# Patient Record
Sex: MALE | Race: WHITE | Employment: OTHER | URBAN - METROPOLITAN AREA
[De-identification: names, ages, dates, MRNs, and addresses within clinical notes are randomized per-mention and may not be internally consistent; named-entity substitution may affect disease eponyms.]

---

## 2024-06-02 ENCOUNTER — APPOINTMENT (OUTPATIENT)
Dept: GENERAL RADIOLOGY | Age: 69
DRG: 637 | End: 2024-06-02
Payer: OTHER GOVERNMENT

## 2024-06-02 ENCOUNTER — HOSPITAL ENCOUNTER (INPATIENT)
Age: 69
LOS: 3 days | Discharge: HOME | DRG: 637 | End: 2024-06-05
Attending: EMERGENCY MEDICINE | Admitting: INTERNAL MEDICINE
Payer: OTHER GOVERNMENT

## 2024-06-02 ENCOUNTER — APPOINTMENT (OUTPATIENT)
Dept: CT IMAGING | Age: 69
DRG: 637 | End: 2024-06-02
Payer: OTHER GOVERNMENT

## 2024-06-02 DIAGNOSIS — N17.9 ACUTE RENAL FAILURE, UNSPECIFIED ACUTE RENAL FAILURE TYPE (HCC): ICD-10-CM

## 2024-06-02 DIAGNOSIS — K20.90 ESOPHAGITIS: ICD-10-CM

## 2024-06-02 DIAGNOSIS — E11.10 DIABETIC KETOACIDOSIS WITHOUT COMA ASSOCIATED WITH TYPE 2 DIABETES MELLITUS (HCC): Primary | ICD-10-CM

## 2024-06-02 DIAGNOSIS — K92.2 GASTROINTESTINAL HEMORRHAGE, UNSPECIFIED GASTROINTESTINAL HEMORRHAGE TYPE: ICD-10-CM

## 2024-06-02 LAB
ALBUMIN SERPL-MCNC: 4.2 G/DL (ref 3.4–5)
ALP SERPL-CCNC: 121 U/L (ref 40–129)
ALT SERPL-CCNC: 13 U/L (ref 10–40)
ANION GAP SERPL CALCULATED.3IONS-SCNC: 38 MMOL/L (ref 3–16)
ANION GAP SERPL CALCULATED.3IONS-SCNC: 39 MMOL/L (ref 3–16)
AST SERPL-CCNC: 14 U/L (ref 15–37)
BASE EXCESS BLDV CALC-SCNC: -19.7 MMOL/L (ref -2–3)
BASOPHILS # BLD: 0 K/UL (ref 0–0.2)
BASOPHILS NFR BLD: 0.3 %
BILIRUB DIRECT SERPL-MCNC: <0.2 MG/DL (ref 0–0.3)
BILIRUB INDIRECT SERPL-MCNC: ABNORMAL MG/DL (ref 0–1)
BILIRUB SERPL-MCNC: 0.3 MG/DL (ref 0–1)
BUN SERPL-MCNC: 50 MG/DL (ref 7–20)
BUN SERPL-MCNC: 50 MG/DL (ref 7–20)
CALCIUM SERPL-MCNC: 9.4 MG/DL (ref 8.3–10.6)
CALCIUM SERPL-MCNC: 9.7 MG/DL (ref 8.3–10.6)
CHLORIDE SERPL-SCNC: 100 MMOL/L (ref 99–110)
CHLORIDE SERPL-SCNC: 97 MMOL/L (ref 99–110)
CO2 BLDV-SCNC: 8 MMOL/L
CO2 SERPL-SCNC: 7 MMOL/L (ref 21–32)
CO2 SERPL-SCNC: 7 MMOL/L (ref 21–32)
COHGB MFR BLDV: 1.2 % (ref 0–1.5)
CREAT SERPL-MCNC: 2.3 MG/DL (ref 0.8–1.3)
CREAT SERPL-MCNC: 2.4 MG/DL (ref 0.8–1.3)
DEPRECATED RDW RBC AUTO: 14 % (ref 12.4–15.4)
DO-HGB MFR BLDV: 27.3 %
EOSINOPHIL # BLD: 0 K/UL (ref 0–0.6)
EOSINOPHIL NFR BLD: 0 %
GFR SERPLBLD CREATININE-BSD FMLA CKD-EPI: 29 ML/MIN/{1.73_M2}
GFR SERPLBLD CREATININE-BSD FMLA CKD-EPI: 30 ML/MIN/{1.73_M2}
GLUCOSE BLD-MCNC: 282 MG/DL (ref 70–99)
GLUCOSE BLD-MCNC: 305 MG/DL (ref 70–99)
GLUCOSE BLD-MCNC: 322 MG/DL (ref 70–99)
GLUCOSE SERPL-MCNC: 306 MG/DL (ref 70–99)
GLUCOSE SERPL-MCNC: 311 MG/DL (ref 70–99)
HCO3 BLDV-SCNC: 7.2 MMOL/L (ref 24–28)
HCT VFR BLD AUTO: 50.2 % (ref 40.5–52.5)
HGB BLD-MCNC: 16.4 G/DL (ref 13.5–17.5)
LACTATE BLDV-SCNC: 2.5 MMOL/L (ref 0.4–2)
LIPASE SERPL-CCNC: 23 U/L (ref 13–60)
LYMPHOCYTES # BLD: 1.3 K/UL (ref 1–5.1)
LYMPHOCYTES NFR BLD: 7.7 %
MAGNESIUM SERPL-MCNC: 2.3 MG/DL (ref 1.8–2.4)
MCH RBC QN AUTO: 28.6 PG (ref 26–34)
MCHC RBC AUTO-ENTMCNC: 32.7 G/DL (ref 31–36)
MCV RBC AUTO: 87.6 FL (ref 80–100)
METHGB MFR BLDV: 0 % (ref 0–1.5)
MONOCYTES # BLD: 0.5 K/UL (ref 0–1.3)
MONOCYTES NFR BLD: 2.9 %
NEUTROPHILS # BLD: 15.6 K/UL (ref 1.7–7.7)
NEUTROPHILS NFR BLD: 89.1 %
NT-PROBNP SERPL-MCNC: 112 PG/ML (ref 0–124)
PCO2 BLDV: 20.7 MMHG (ref 41–51)
PERFORMED ON: ABNORMAL
PH BLDV: 7.15 [PH] (ref 7.35–7.45)
PHOSPHATE SERPL-MCNC: 6.6 MG/DL (ref 2.5–4.9)
PLATELET # BLD AUTO: 335 K/UL (ref 135–450)
PMV BLD AUTO: 7.8 FL (ref 5–10.5)
PO2 BLDV: 42.3 MMHG (ref 25–40)
POTASSIUM SERPL-SCNC: 5.2 MMOL/L (ref 3.5–5.1)
POTASSIUM SERPL-SCNC: 5.2 MMOL/L (ref 3.5–5.1)
PROT SERPL-MCNC: 7.4 G/DL (ref 6.4–8.2)
RBC # BLD AUTO: 5.72 M/UL (ref 4.2–5.9)
SAO2 % BLDV: 72 %
SODIUM SERPL-SCNC: 143 MMOL/L (ref 136–145)
SODIUM SERPL-SCNC: 145 MMOL/L (ref 136–145)
TROPONIN, HIGH SENSITIVITY: 76 NG/L (ref 0–22)
TROPONIN, HIGH SENSITIVITY: 80 NG/L (ref 0–22)
WBC # BLD AUTO: 17.5 K/UL (ref 4–11)

## 2024-06-02 PROCEDURE — 74176 CT ABD & PELVIS W/O CONTRAST: CPT

## 2024-06-02 PROCEDURE — 2580000003 HC RX 258

## 2024-06-02 PROCEDURE — C9113 INJ PANTOPRAZOLE SODIUM, VIA: HCPCS | Performed by: EMERGENCY MEDICINE

## 2024-06-02 PROCEDURE — 6370000000 HC RX 637 (ALT 250 FOR IP): Performed by: EMERGENCY MEDICINE

## 2024-06-02 PROCEDURE — 85025 COMPLETE CBC W/AUTO DIFF WBC: CPT

## 2024-06-02 PROCEDURE — 80076 HEPATIC FUNCTION PANEL: CPT

## 2024-06-02 PROCEDURE — 82803 BLOOD GASES ANY COMBINATION: CPT

## 2024-06-02 PROCEDURE — 96374 THER/PROPH/DIAG INJ IV PUSH: CPT

## 2024-06-02 PROCEDURE — 71045 X-RAY EXAM CHEST 1 VIEW: CPT

## 2024-06-02 PROCEDURE — 82010 KETONE BODYS QUAN: CPT

## 2024-06-02 PROCEDURE — 99285 EMERGENCY DEPT VISIT HI MDM: CPT

## 2024-06-02 PROCEDURE — 81001 URINALYSIS AUTO W/SCOPE: CPT

## 2024-06-02 PROCEDURE — 83036 HEMOGLOBIN GLYCOSYLATED A1C: CPT

## 2024-06-02 PROCEDURE — 83735 ASSAY OF MAGNESIUM: CPT

## 2024-06-02 PROCEDURE — 2580000003 HC RX 258: Performed by: EMERGENCY MEDICINE

## 2024-06-02 PROCEDURE — 96375 TX/PRO/DX INJ NEW DRUG ADDON: CPT

## 2024-06-02 PROCEDURE — 93005 ELECTROCARDIOGRAM TRACING: CPT | Performed by: EMERGENCY MEDICINE

## 2024-06-02 PROCEDURE — 84484 ASSAY OF TROPONIN QUANT: CPT

## 2024-06-02 PROCEDURE — 83605 ASSAY OF LACTIC ACID: CPT

## 2024-06-02 PROCEDURE — 6360000002 HC RX W HCPCS: Performed by: EMERGENCY MEDICINE

## 2024-06-02 PROCEDURE — 80048 BASIC METABOLIC PNL TOTAL CA: CPT

## 2024-06-02 PROCEDURE — 83880 ASSAY OF NATRIURETIC PEPTIDE: CPT

## 2024-06-02 PROCEDURE — 83690 ASSAY OF LIPASE: CPT

## 2024-06-02 PROCEDURE — 84100 ASSAY OF PHOSPHORUS: CPT

## 2024-06-02 PROCEDURE — 2000000000 HC ICU R&B

## 2024-06-02 RX ORDER — PANTOPRAZOLE SODIUM 40 MG/10ML
40 INJECTION, POWDER, LYOPHILIZED, FOR SOLUTION INTRAVENOUS ONCE
Status: COMPLETED | OUTPATIENT
Start: 2024-06-02 | End: 2024-06-02

## 2024-06-02 RX ORDER — DEXTROSE MONOHYDRATE 100 MG/ML
INJECTION, SOLUTION INTRAVENOUS CONTINUOUS PRN
Status: DISCONTINUED | OUTPATIENT
Start: 2024-06-02 | End: 2024-06-05 | Stop reason: HOSPADM

## 2024-06-02 RX ORDER — ONDANSETRON 2 MG/ML
4 INJECTION INTRAMUSCULAR; INTRAVENOUS ONCE
Status: COMPLETED | OUTPATIENT
Start: 2024-06-02 | End: 2024-06-02

## 2024-06-02 RX ORDER — SODIUM CHLORIDE 450 MG/100ML
INJECTION, SOLUTION INTRAVENOUS CONTINUOUS
Status: DISCONTINUED | OUTPATIENT
Start: 2024-06-02 | End: 2024-06-05 | Stop reason: HOSPADM

## 2024-06-02 RX ORDER — DEXTROSE MONOHYDRATE AND SODIUM CHLORIDE 5; .45 G/100ML; G/100ML
INJECTION, SOLUTION INTRAVENOUS CONTINUOUS PRN
Status: DISCONTINUED | OUTPATIENT
Start: 2024-06-02 | End: 2024-06-03 | Stop reason: SDUPTHER

## 2024-06-02 RX ORDER — MAGNESIUM SULFATE IN WATER 40 MG/ML
2000 INJECTION, SOLUTION INTRAVENOUS PRN
Status: DISCONTINUED | OUTPATIENT
Start: 2024-06-02 | End: 2024-06-05 | Stop reason: ALTCHOICE

## 2024-06-02 RX ORDER — SODIUM CHLORIDE 450 MG/100ML
INJECTION, SOLUTION INTRAVENOUS CONTINUOUS
Status: DISCONTINUED | OUTPATIENT
Start: 2024-06-02 | End: 2024-06-03 | Stop reason: SDUPTHER

## 2024-06-02 RX ORDER — SODIUM CHLORIDE, SODIUM LACTATE, POTASSIUM CHLORIDE, CALCIUM CHLORIDE 600; 310; 30; 20 MG/100ML; MG/100ML; MG/100ML; MG/100ML
INJECTION, SOLUTION INTRAVENOUS ONCE
Status: COMPLETED | OUTPATIENT
Start: 2024-06-02 | End: 2024-06-02

## 2024-06-02 RX ORDER — ENOXAPARIN SODIUM 100 MG/ML
30 INJECTION SUBCUTANEOUS 2 TIMES DAILY
Status: DISCONTINUED | OUTPATIENT
Start: 2024-06-02 | End: 2024-06-03

## 2024-06-02 RX ORDER — POLYETHYLENE GLYCOL 3350 17 G/17G
17 POWDER, FOR SOLUTION ORAL DAILY PRN
Status: DISCONTINUED | OUTPATIENT
Start: 2024-06-02 | End: 2024-06-05 | Stop reason: HOSPADM

## 2024-06-02 RX ORDER — GLUCAGON 1 MG/ML
1 KIT INJECTION PRN
Status: DISCONTINUED | OUTPATIENT
Start: 2024-06-02 | End: 2024-06-05 | Stop reason: HOSPADM

## 2024-06-02 RX ORDER — POTASSIUM CHLORIDE 7.45 MG/ML
10 INJECTION INTRAVENOUS PRN
Status: DISCONTINUED | OUTPATIENT
Start: 2024-06-02 | End: 2024-06-05 | Stop reason: ALTCHOICE

## 2024-06-02 RX ORDER — DEXTROSE MONOHYDRATE AND SODIUM CHLORIDE 5; .45 G/100ML; G/100ML
INJECTION, SOLUTION INTRAVENOUS CONTINUOUS PRN
Status: DISCONTINUED | OUTPATIENT
Start: 2024-06-02 | End: 2024-06-05 | Stop reason: HOSPADM

## 2024-06-02 RX ADMIN — SODIUM CHLORIDE: 4.5 INJECTION, SOLUTION INTRAVENOUS at 22:27

## 2024-06-02 RX ADMIN — ONDANSETRON 4 MG: 2 INJECTION INTRAMUSCULAR; INTRAVENOUS at 21:04

## 2024-06-02 RX ADMIN — SODIUM CHLORIDE, POTASSIUM CHLORIDE, SODIUM LACTATE AND CALCIUM CHLORIDE: 600; 310; 30; 20 INJECTION, SOLUTION INTRAVENOUS at 21:19

## 2024-06-02 RX ADMIN — SODIUM CHLORIDE: 4.5 INJECTION, SOLUTION INTRAVENOUS at 23:56

## 2024-06-02 RX ADMIN — SODIUM CHLORIDE 0.1 UNITS/HR: 9 INJECTION, SOLUTION INTRAVENOUS at 22:25

## 2024-06-02 RX ADMIN — PANTOPRAZOLE SODIUM 40 MG: 40 INJECTION, POWDER, FOR SOLUTION INTRAVENOUS at 21:03

## 2024-06-02 ASSESSMENT — LIFESTYLE VARIABLES
HOW OFTEN DO YOU HAVE A DRINK CONTAINING ALCOHOL: NEVER
HOW MANY STANDARD DRINKS CONTAINING ALCOHOL DO YOU HAVE ON A TYPICAL DAY: PATIENT DOES NOT DRINK

## 2024-06-03 LAB
AMORPH SED URNS QL MICRO: ABNORMAL /HPF
ANION GAP SERPL CALCULATED.3IONS-SCNC: 14 MMOL/L (ref 3–16)
ANION GAP SERPL CALCULATED.3IONS-SCNC: 15 MMOL/L (ref 3–16)
ANION GAP SERPL CALCULATED.3IONS-SCNC: 18 MMOL/L (ref 3–16)
ANION GAP SERPL CALCULATED.3IONS-SCNC: 30 MMOL/L (ref 3–16)
BASOPHILS # BLD: 0 K/UL (ref 0–0.2)
BASOPHILS NFR BLD: 0.3 %
BETA-HYDROXYBUTYRATE: >8 MMOL/L (ref 0–0.27)
BILIRUB UR QL STRIP.AUTO: ABNORMAL
BUN SERPL-MCNC: 40 MG/DL (ref 7–20)
BUN SERPL-MCNC: 45 MG/DL (ref 7–20)
BUN SERPL-MCNC: 50 MG/DL (ref 7–20)
BUN SERPL-MCNC: 53 MG/DL (ref 7–20)
CALCIUM SERPL-MCNC: 8.3 MG/DL (ref 8.3–10.6)
CALCIUM SERPL-MCNC: 8.5 MG/DL (ref 8.3–10.6)
CALCIUM SERPL-MCNC: 8.6 MG/DL (ref 8.3–10.6)
CALCIUM SERPL-MCNC: 9.2 MG/DL (ref 8.3–10.6)
CHLORIDE SERPL-SCNC: 103 MMOL/L (ref 99–110)
CHLORIDE SERPL-SCNC: 104 MMOL/L (ref 99–110)
CHLORIDE SERPL-SCNC: 105 MMOL/L (ref 99–110)
CHLORIDE SERPL-SCNC: 106 MMOL/L (ref 99–110)
CLARITY UR: CLEAR
CO2 SERPL-SCNC: 12 MMOL/L (ref 21–32)
CO2 SERPL-SCNC: 18 MMOL/L (ref 21–32)
CO2 SERPL-SCNC: 21 MMOL/L (ref 21–32)
CO2 SERPL-SCNC: 22 MMOL/L (ref 21–32)
COLOR UR: YELLOW
CREAT SERPL-MCNC: 1.8 MG/DL (ref 0.8–1.3)
CREAT SERPL-MCNC: 2 MG/DL (ref 0.8–1.3)
CREAT SERPL-MCNC: 2 MG/DL (ref 0.8–1.3)
CREAT SERPL-MCNC: 2.4 MG/DL (ref 0.8–1.3)
DEPRECATED RDW RBC AUTO: 14 % (ref 12.4–15.4)
EKG ATRIAL RATE: 94 BPM
EKG DIAGNOSIS: NORMAL
EKG P AXIS: 63 DEGREES
EKG P-R INTERVAL: 160 MS
EKG Q-T INTERVAL: 378 MS
EKG QRS DURATION: 68 MS
EKG QTC CALCULATION (BAZETT): 472 MS
EKG R AXIS: 39 DEGREES
EKG T AXIS: 43 DEGREES
EKG VENTRICULAR RATE: 94 BPM
EOSINOPHIL # BLD: 0 K/UL (ref 0–0.6)
EOSINOPHIL NFR BLD: 0 %
EPI CELLS #/AREA URNS HPF: ABNORMAL /HPF (ref 0–5)
EST. AVERAGE GLUCOSE BLD GHB EST-MCNC: 246 MG/DL
GFR SERPLBLD CREATININE-BSD FMLA CKD-EPI: 29 ML/MIN/{1.73_M2}
GFR SERPLBLD CREATININE-BSD FMLA CKD-EPI: 36 ML/MIN/{1.73_M2}
GFR SERPLBLD CREATININE-BSD FMLA CKD-EPI: 36 ML/MIN/{1.73_M2}
GFR SERPLBLD CREATININE-BSD FMLA CKD-EPI: 41 ML/MIN/{1.73_M2}
GLUCOSE BLD-MCNC: 125 MG/DL (ref 70–99)
GLUCOSE BLD-MCNC: 127 MG/DL (ref 70–99)
GLUCOSE BLD-MCNC: 140 MG/DL (ref 70–99)
GLUCOSE BLD-MCNC: 140 MG/DL (ref 70–99)
GLUCOSE BLD-MCNC: 142 MG/DL (ref 70–99)
GLUCOSE BLD-MCNC: 148 MG/DL (ref 70–99)
GLUCOSE BLD-MCNC: 154 MG/DL (ref 70–99)
GLUCOSE BLD-MCNC: 154 MG/DL (ref 70–99)
GLUCOSE BLD-MCNC: 162 MG/DL (ref 70–99)
GLUCOSE BLD-MCNC: 163 MG/DL (ref 70–99)
GLUCOSE BLD-MCNC: 166 MG/DL (ref 70–99)
GLUCOSE BLD-MCNC: 167 MG/DL (ref 70–99)
GLUCOSE BLD-MCNC: 181 MG/DL (ref 70–99)
GLUCOSE BLD-MCNC: 186 MG/DL (ref 70–99)
GLUCOSE BLD-MCNC: 198 MG/DL (ref 70–99)
GLUCOSE BLD-MCNC: 207 MG/DL (ref 70–99)
GLUCOSE BLD-MCNC: 212 MG/DL (ref 70–99)
GLUCOSE BLD-MCNC: 232 MG/DL (ref 70–99)
GLUCOSE BLD-MCNC: 265 MG/DL (ref 70–99)
GLUCOSE BLD-MCNC: 265 MG/DL (ref 70–99)
GLUCOSE BLD-MCNC: 88 MG/DL (ref 70–99)
GLUCOSE BLD-MCNC: 91 MG/DL (ref 70–99)
GLUCOSE SERPL-MCNC: 178 MG/DL (ref 70–99)
GLUCOSE SERPL-MCNC: 193 MG/DL (ref 70–99)
GLUCOSE SERPL-MCNC: 266 MG/DL (ref 70–99)
GLUCOSE SERPL-MCNC: 91 MG/DL (ref 70–99)
GLUCOSE UR STRIP.AUTO-MCNC: >=1000 MG/DL
HBA1C MFR BLD: 10.2 %
HCT VFR BLD AUTO: 47.7 % (ref 40.5–52.5)
HGB BLD-MCNC: 16.2 G/DL (ref 13.5–17.5)
HGB UR QL STRIP.AUTO: ABNORMAL
KETONES UR STRIP.AUTO-MCNC: >=80 MG/DL
LACTATE BLDV-SCNC: 2.1 MMOL/L (ref 0.4–2)
LEUKOCYTE ESTERASE UR QL STRIP.AUTO: ABNORMAL
LIPASE SERPL-CCNC: 53 U/L (ref 13–60)
LYMPHOCYTES # BLD: 1.3 K/UL (ref 1–5.1)
LYMPHOCYTES NFR BLD: 9.1 %
MAGNESIUM SERPL-MCNC: 2 MG/DL (ref 1.8–2.4)
MAGNESIUM SERPL-MCNC: 2.2 MG/DL (ref 1.8–2.4)
MCH RBC QN AUTO: 29.5 PG (ref 26–34)
MCHC RBC AUTO-ENTMCNC: 34 G/DL (ref 31–36)
MCV RBC AUTO: 86.6 FL (ref 80–100)
MONOCYTES # BLD: 1.1 K/UL (ref 0–1.3)
MONOCYTES NFR BLD: 7.2 %
MUCOUS THREADS #/AREA URNS LPF: ABNORMAL /LPF
NEUTROPHILS # BLD: 12.4 K/UL (ref 1.7–7.7)
NEUTROPHILS NFR BLD: 83.4 %
NITRITE UR QL STRIP.AUTO: NEGATIVE
PERFORMED ON: ABNORMAL
PERFORMED ON: NORMAL
PERFORMED ON: NORMAL
PH UR STRIP.AUTO: 6 [PH] (ref 5–8)
PHOSPHATE SERPL-MCNC: 2.2 MG/DL (ref 2.5–4.9)
PHOSPHATE SERPL-MCNC: 2.4 MG/DL (ref 2.5–4.9)
PHOSPHATE SERPL-MCNC: 2.4 MG/DL (ref 2.5–4.9)
PHOSPHATE SERPL-MCNC: 4.5 MG/DL (ref 2.5–4.9)
PLATELET # BLD AUTO: 262 K/UL (ref 135–450)
PMV BLD AUTO: 7.5 FL (ref 5–10.5)
POTASSIUM SERPL-SCNC: 3.3 MMOL/L (ref 3.5–5.1)
POTASSIUM SERPL-SCNC: 3.4 MMOL/L (ref 3.5–5.1)
POTASSIUM SERPL-SCNC: 3.5 MMOL/L (ref 3.5–5.1)
POTASSIUM SERPL-SCNC: 5.2 MMOL/L (ref 3.5–5.1)
PROT UR STRIP.AUTO-MCNC: 30 MG/DL
RBC # BLD AUTO: 5.51 M/UL (ref 4.2–5.9)
RBC #/AREA URNS HPF: ABNORMAL /HPF (ref 0–4)
SODIUM SERPL-SCNC: 139 MMOL/L (ref 136–145)
SODIUM SERPL-SCNC: 141 MMOL/L (ref 136–145)
SODIUM SERPL-SCNC: 143 MMOL/L (ref 136–145)
SODIUM SERPL-SCNC: 145 MMOL/L (ref 136–145)
SP GR UR STRIP.AUTO: >=1.03 (ref 1–1.03)
UA DIPSTICK W REFLEX MICRO PNL UR: YES
URN SPEC COLLECT METH UR: ABNORMAL
UROBILINOGEN UR STRIP-ACNC: 0.2 E.U./DL
WBC # BLD AUTO: 14.8 K/UL (ref 4–11)
WBC #/AREA URNS HPF: ABNORMAL /HPF (ref 0–5)

## 2024-06-03 PROCEDURE — 84100 ASSAY OF PHOSPHORUS: CPT

## 2024-06-03 PROCEDURE — 99223 1ST HOSP IP/OBS HIGH 75: CPT | Performed by: INTERNAL MEDICINE

## 2024-06-03 PROCEDURE — 2580000003 HC RX 258

## 2024-06-03 PROCEDURE — 6360000002 HC RX W HCPCS: Performed by: INTERNAL MEDICINE

## 2024-06-03 PROCEDURE — 6370000000 HC RX 637 (ALT 250 FOR IP)

## 2024-06-03 PROCEDURE — 2580000003 HC RX 258: Performed by: EMERGENCY MEDICINE

## 2024-06-03 PROCEDURE — 83690 ASSAY OF LIPASE: CPT

## 2024-06-03 PROCEDURE — 2000000000 HC ICU R&B

## 2024-06-03 PROCEDURE — 83605 ASSAY OF LACTIC ACID: CPT

## 2024-06-03 PROCEDURE — 2500000003 HC RX 250 WO HCPCS: Performed by: EMERGENCY MEDICINE

## 2024-06-03 PROCEDURE — 83735 ASSAY OF MAGNESIUM: CPT

## 2024-06-03 PROCEDURE — 36415 COLL VENOUS BLD VENIPUNCTURE: CPT

## 2024-06-03 PROCEDURE — 80048 BASIC METABOLIC PNL TOTAL CA: CPT

## 2024-06-03 PROCEDURE — 2580000003 HC RX 258: Performed by: INTERNAL MEDICINE

## 2024-06-03 PROCEDURE — 85025 COMPLETE CBC W/AUTO DIFF WBC: CPT

## 2024-06-03 PROCEDURE — 6360000002 HC RX W HCPCS

## 2024-06-03 PROCEDURE — C9113 INJ PANTOPRAZOLE SODIUM, VIA: HCPCS

## 2024-06-03 RX ORDER — GABAPENTIN 300 MG/1
300 CAPSULE ORAL EVERY EVENING
COMMUNITY
Start: 2024-02-15

## 2024-06-03 RX ORDER — ALOGLIPTIN 25 MG/1
25 TABLET, FILM COATED ORAL DAILY
COMMUNITY
Start: 2024-02-15

## 2024-06-03 RX ORDER — METFORMIN HYDROCHLORIDE 500 MG/1
1000 TABLET, EXTENDED RELEASE ORAL 2 TIMES DAILY
COMMUNITY
Start: 2024-02-15

## 2024-06-03 RX ORDER — ATORVASTATIN CALCIUM 20 MG/1
20 TABLET, FILM COATED ORAL DAILY
COMMUNITY
Start: 2024-02-15

## 2024-06-03 RX ORDER — CLOTRIMAZOLE 1 %
CREAM (GRAM) TOPICAL 2 TIMES DAILY
COMMUNITY

## 2024-06-03 RX ORDER — MULTIVITAMIN
1 CAPSULE ORAL DAILY
COMMUNITY
Start: 2015-03-03

## 2024-06-03 RX ORDER — INSULIN GLARGINE 100 [IU]/ML
40 INJECTION, SOLUTION SUBCUTANEOUS NIGHTLY
COMMUNITY

## 2024-06-03 RX ORDER — SODIUM CHLORIDE, SODIUM LACTATE, POTASSIUM CHLORIDE, AND CALCIUM CHLORIDE .6; .31; .03; .02 G/100ML; G/100ML; G/100ML; G/100ML
1000 INJECTION, SOLUTION INTRAVENOUS ONCE
Status: COMPLETED | OUTPATIENT
Start: 2024-06-03 | End: 2024-06-03

## 2024-06-03 RX ORDER — LISINOPRIL 10 MG/1
5 TABLET ORAL DAILY
COMMUNITY
Start: 2024-02-15

## 2024-06-03 RX ORDER — PANTOPRAZOLE SODIUM 40 MG/10ML
40 INJECTION, POWDER, LYOPHILIZED, FOR SOLUTION INTRAVENOUS 2 TIMES DAILY
Status: DISCONTINUED | OUTPATIENT
Start: 2024-06-03 | End: 2024-06-04

## 2024-06-03 RX ADMIN — PANTOPRAZOLE SODIUM 40 MG: 40 INJECTION, POWDER, FOR SOLUTION INTRAVENOUS at 08:03

## 2024-06-03 RX ADMIN — SODIUM CHLORIDE, POTASSIUM CHLORIDE, SODIUM LACTATE AND CALCIUM CHLORIDE 1000 ML: 600; 310; 30; 20 INJECTION, SOLUTION INTRAVENOUS at 10:26

## 2024-06-03 RX ADMIN — DEXTROSE AND SODIUM CHLORIDE: 5; 450 INJECTION, SOLUTION INTRAVENOUS at 08:40

## 2024-06-03 RX ADMIN — POTASSIUM BICARBONATE 40 MEQ: 782 TABLET, EFFERVESCENT ORAL at 22:11

## 2024-06-03 RX ADMIN — AMPICILLIN AND SULBACTAM 3000 MG: 2; 1 INJECTION, POWDER, FOR SOLUTION INTRAVENOUS at 13:16

## 2024-06-03 RX ADMIN — PANTOPRAZOLE SODIUM 40 MG: 40 INJECTION, POWDER, FOR SOLUTION INTRAVENOUS at 20:14

## 2024-06-03 RX ADMIN — DEXTROSE AND SODIUM CHLORIDE: 5; 450 INJECTION, SOLUTION INTRAVENOUS at 22:22

## 2024-06-03 RX ADMIN — SODIUM PHOSPHATE, MONOBASIC, MONOHYDRATE AND SODIUM PHOSPHATE, DIBASIC, ANHYDROUS 15 MMOL: 142; 276 INJECTION, SOLUTION INTRAVENOUS at 22:18

## 2024-06-03 RX ADMIN — DEXTROSE AND SODIUM CHLORIDE: 5; 450 INJECTION, SOLUTION INTRAVENOUS at 15:33

## 2024-06-03 RX ADMIN — DEXTROSE AND SODIUM CHLORIDE: 5; 450 INJECTION, SOLUTION INTRAVENOUS at 01:53

## 2024-06-03 RX ADMIN — AMPICILLIN AND SULBACTAM 3000 MG: 2; 1 INJECTION, POWDER, FOR SOLUTION INTRAVENOUS at 18:32

## 2024-06-03 ASSESSMENT — ENCOUNTER SYMPTOMS
VOMITING: 1
CONSTIPATION: 0
SHORTNESS OF BREATH: 0
BLOOD IN STOOL: 0
EYE PAIN: 0
COLOR CHANGE: 0
DIARRHEA: 0
NAUSEA: 1
ABDOMINAL PAIN: 1
BACK PAIN: 0
COUGH: 0
EYE REDNESS: 0
TROUBLE SWALLOWING: 0
CHEST TIGHTNESS: 0

## 2024-06-03 ASSESSMENT — PAIN SCALES - GENERAL
PAINLEVEL_OUTOF10: 0

## 2024-06-03 NOTE — FLOWSHEET NOTE
1400 BGT 88- discussed with ICU residents pause insulin until BMP results.   D5 1/2 NS at 150 ml/hr continues to infuse.     Lab at bedside obtaining BMP, Mag/Phos for DKA lab orders.

## 2024-06-03 NOTE — CONSULTS
[facility]  GI Consultation                                                                 Patient: Cynthia Talavera  : 1955       Date:  6/3/2024    Subjective:       History of Present Illness  Patient is a 68 y.o.  male admitted with Acute renal failure, unspecified acute renal failure type (HCC) [N17.9]  Diabetic ketoacidosis without coma associated with type 2 diabetes mellitus (HCC) [E11.10] who is seen in consult for hematemesis.  History of CKD, prostate cancer, type 2 diabetes mellitus, history of PE on Xarelto typically followed at [facility] (no records available).  Patient admitted 2024 with onset of vomiting recurrently throughout the day in addition to upper abdominal/lower chest pain.  Patient notes that after vomiting several times he began to have dark coffee ground material in his emesis.  He denies melena, hematochezia.  He states today his vomiting has ceased and he no longer has abdominal or chest pain.  He is tolerating a clear liquid diet.  He was found to have DKA on arrival which has been managed with insulin drip.        History reviewed. No pertinent past medical history.   No past surgical history on file.       Admission Meds  No current facility-administered medications on file prior to encounter.     Current Outpatient Medications on File Prior to Encounter   Medication Sig Dispense Refill    Multiple Vitamin (MULTIVITAMIN) capsule Take 1 capsule by mouth daily      atorvastatin (LIPITOR) 20 MG tablet Take 1 tablet by mouth daily      cyanocobalamin 500 MCG tablet Take 1 tablet by mouth daily      empagliflozin (JARDIANCE) 25 MG tablet Take 1 tablet by mouth daily      gabapentin (NEURONTIN) 300 MG capsule Take 1 capsule by mouth every evening.      lisinopril (PRINIVIL;ZESTRIL) 10 MG tablet Take 0.5 tablets by mouth daily      metFORMIN (GLUCOPHAGE-XR) 500 MG extended release tablet Take 2 tablets by mouth 2 times daily      rivaroxaban (XARELTO) 20 MG

## 2024-06-03 NOTE — PLAN OF CARE
Problem: Discharge Planning  Goal: Discharge to home or other facility with appropriate resources  6/3/2024 1237 by Dianna Peña RN  Outcome: Progressing  Flowsheets (Taken 6/3/2024 0800)  Discharge to home or other facility with appropriate resources: Identify barriers to discharge with patient and caregiver  6/3/2024 0358 by Vladimir Washington RN  Outcome: Progressing  6/3/2024 0357 by Vladimir Washington RN  Outcome: Progressing     Problem: Pain  Goal: Verbalizes/displays adequate comfort level or baseline comfort level  6/3/2024 1237 by Dianna Peña RN  Outcome: Progressing  Flowsheets (Taken 6/3/2024 0800)  Verbalizes/displays adequate comfort level or baseline comfort level: Encourage patient to monitor pain and request assistance  6/3/2024 0358 by Vladimir Washington RN  Outcome: Progressing  6/3/2024 0357 by Vladimir Washington RN  Outcome: Progressing     Problem: Safety - Adult  Goal: Free from fall injury  6/3/2024 1237 by Dianna Peña RN  Outcome: Progressing  Flowsheets (Taken 6/3/2024 0815)  Free From Fall Injury: Instruct family/caregiver on patient safety  6/3/2024 0358 by Vladimir Washington RN  Outcome: Progressing  6/3/2024 0357 by Vladimir Washington RN  Outcome: Progressing     Problem: Skin/Tissue Integrity  Goal: Absence of new skin breakdown  Description: 1.  Monitor for areas of redness and/or skin breakdown  2.  Assess vascular access sites hourly  3.  Every 4-6 hours minimum:  Change oxygen saturation probe site  4.  Every 4-6 hours:  If on nasal continuous positive airway pressure, respiratory therapy assess nares and determine need for appliance change or resting period.  6/3/2024 1237 by Dianna Peña RN  Outcome: Progressing  6/3/2024 0358 by Vladimir Washington RN  Outcome: Progressing     Problem: Chronic Conditions and Co-morbidities  Goal: Patient's chronic conditions and co-morbidity symptoms are monitored and maintained or improved  6/3/2024 1237 by Dianna Peña

## 2024-06-03 NOTE — ED PROVIDER NOTES
Potassium 5.2 (H) 3.5 - 5.1 mmol/L    Chloride 97 (L) 99 - 110 mmol/L    CO2 7 (LL) 21 - 32 mmol/L    Anion Gap 39 (H) 3 - 16    Glucose 306 (H) 70 - 99 mg/dL    BUN 50 (H) 7 - 20 mg/dL    Creatinine 2.3 (H) 0.8 - 1.3 mg/dL    Est, Glom Filt Rate 30 (A) >60    Calcium 9.7 8.3 - 10.6 mg/dL   Hepatic Function Panel   Result Value Ref Range    Total Protein 7.4 6.4 - 8.2 g/dL    Albumin 4.2 3.4 - 5.0 g/dL    Alkaline Phosphatase 121 40 - 129 U/L    ALT 13 10 - 40 U/L    AST 14 (L) 15 - 37 U/L    Total Bilirubin 0.3 0.0 - 1.0 mg/dL    Bilirubin, Direct <0.2 0.0 - 0.3 mg/dL    Bilirubin, Indirect see below 0.0 - 1.0 mg/dL   Lipase   Result Value Ref Range    Lipase 23.0 13.0 - 60.0 U/L   Blood Gas, Venous   Result Value Ref Range    pH, Lacho 7.148 (LL) 7.350 - 7.450    pCO2, Lacho 20.7 (L) 41.0 - 51.0 mmHg    pO2, Lacho 42.3 (H) 25.0 - 40.0 mmHg    HCO3, Venous 7.2 (L) 24.0 - 28.0 mmol/L    Base Excess, Lacho -19.7 (L) -2.0 - 3.0 mmol/L    O2 Sat, Lacho 72 Not established %    Carboxyhemoglobin 1.2 0.0 - 1.5 %    MetHgb, Lacho 0.0 0.0 - 1.5 %    TC02 (Calc), Lacho 8 mmol/L    Hemoglobin, Lacho, Reduced 27.30 %   Lactic Acid   Result Value Ref Range    Lactic Acid 2.5 (H) 0.4 - 2.0 mmol/L   Brain Natriuretic Peptide   Result Value Ref Range    Pro- 0 - 124 pg/mL   Troponin   Result Value Ref Range    Troponin, High Sensitivity 80 (H) 0 - 22 ng/L   POCT Glucose   Result Value Ref Range    POC Glucose 305 (H) 70 - 99 mg/dl    Performed on ACCU-CHEK    POCT Glucose   Result Value Ref Range    POC Glucose 322 (H) 70 - 99 mg/dl    Performed on ACCU-CHEK        EKG   Normal sinus rhythm with a ventricular rate of 94 poor anterior R wave progression otherwise no evidence of any ST or T wave changes that would be indicative of active ischemia there is a normal axis and normal intervals.  There is no old EKG to which I can directly compare to our system.    ED BEDSIDE ULTRASOUND:  No results found.    MOST RECENT VITALS:  BP: 129/69,Temp:  98 °F (36.7 °C), Pulse: 95, Respirations: 15, SpO2: 97 %       ED Course     Nursing Notes, Past Medical Hx, Past Surgical Hx, Social Hx,Allergies, and Family Hx were reviewed.         The patient was given the following medications:  Orders Placed This Encounter   Medications    ondansetron (ZOFRAN) injection 4 mg    pantoprazole (PROTONIX) injection 40 mg    lactated ringers IV soln infusion    insulin regular (HUMULIN R;NOVOLIN R) 100 Units in sodium chloride 0.9 % 100 mL infusion     Order Specific Question:   Goal Blood Glucose Range     Answer:   DKA: 150-200     Order Specific Question:   Calculate initial bolus with the embedded Insulin Calculator?     Answer:   No    OR Linked Order Group     dextrose bolus 10% 125 mL     dextrose bolus 10% 250 mL    potassium chloride 10 mEq/100 mL IVPB (Peripheral Line)    magnesium sulfate 2000 mg in 50 mL IVPB premix    sodium phosphate 15 mmol in sodium chloride 0.9 % 250 mL IVPB    dextrose 5 % and 0.45 % sodium chloride infusion    0.45 % sodium chloride infusion       CONSULTS:  None    Review of Systems     As documented in the HPI, otherwise all other systems were reviewed and were negative.    Past Medical, Surgical, Family, and Social History     He has no past medical history on file.  He has no past surgical history on file.  His family history is not on file.  He reports that he does not currently use alcohol.    Medications     Previous Medications    No medications on file       Allergies     He has No Known Allergies.    Physical Exam     INITIAL VITALS: BP: 129/69, Temp: 98 °F (36.7 °C), Pulse: 95, Respirations: 15, SpO2: 97 %   General: 68-year-old male lying in bed appears as if he feels uncomfortable but is not in any cardiac distress  HEENT:  head is atraumatic, pupils equal round and reactive to light, sclera are clear, oropharynx is nonerythematous  Neck: supple, no lymphadenopathy  Chest: nonlabored respirations, equal chest rise bilaterally, no

## 2024-06-03 NOTE — H&P
ICU H&P      Hospital Day: 0  ICU Day: Patient does not have an ICU stay during this admission.  Code: No Order  Admit Date: 6/2/2024  PCP: No primary care provider on file.                                  CC: Abdominal Pain (Upper Abdominal lower chest pain started with vomiting at 345am, pt stats vomit started clear now dark brown, no GI history, 4mg zofran given en route, pt takes blood thinner)       HISTORY OF PRESENT ILLNESS:   Cynthia Talavera is a 68 year old male with PMHx of CKD, prostate cancer, T2DM, hx of PE on rivaroxaban, and narcissistic personality disorder who presents to the ED on 6/2/2024 for nausea, vomiting, and abdominal pain. He stated these symptoms started this morning with nonbloody emesis and epigastric abdominal pain. He noticed his emesis becoming darker in the afternoon with no visible blood. He had sharp abdominal pain radiating to his chest and back. Denied any similar symptoms in the past. Denies any dysuria, diarrhea, fevers, chills, headaches, flu-like symptoms, or sick contacts.     Upon arrival to the ED, Vitals /69, Temp 98 F, Pulse 95, RR 15 and saturating on room air at 97%. Patient's lab work was significant for bicarb 7, AG 39, Bun 50, Cr 2.3, glucose of 306, and WBC 17.5. Lactic acid 2.5.  VBG showed pH 7.148. Patient was given protonix 40 mg, zofran 4 mg, and 1L LR. He was started on insulin drip. Patient was admitted to ICU for further management of DKA.     PAST HISTORY:   No past medical history on file.    No past surgical history on file.    SocialHistory:  The patient lives at home.    Alcohol: none  Illicit drugs: none  Tobacco: none    Family History:  No family history on file.    MEDICATIONS:     No current facility-administered medications on file prior to encounter.     No current outpatient medications on file prior to encounter.       Scheduled Meds:    Continuous Infusions:    insulin 0.1 Units/hr (06/02/24 5877)    dextrose 5 % and 0.45 % NaCl

## 2024-06-03 NOTE — PLAN OF CARE
Problem: Discharge Planning  Goal: Discharge to home or other facility with appropriate resources  6/3/2024 0358 by Vladimir Washington, RN  Outcome: Progressing     Problem: Pain  Goal: Verbalizes/displays adequate comfort level or baseline comfort level  6/3/2024 0358 by Vladimir Washington, RN  Outcome: Progressing     Problem: Safety - Adult  Goal: Free from fall injury  6/3/2024 0358 by Vladimir Washington, RN  Outcome: Progressing     Problem: Skin/Tissue Integrity  Goal: Absence of new skin breakdown  Description: 1.  Monitor for areas of redness and/or skin breakdown  2.  Assess vascular access sites hourly  3.  Every 4-6 hours minimum:  Change oxygen saturation probe site  4.  Every 4-6 hours:  If on nasal continuous positive airway pressure, respiratory therapy assess nares and determine need for appliance change or resting period.  Outcome: Progressing     Problem: Chronic Conditions and Co-morbidities  Goal: Patient's chronic conditions and co-morbidity symptoms are monitored and maintained or improved  Outcome: Progressing

## 2024-06-03 NOTE — CARE COORDINATION
Case Management Assessment  Initial Evaluation    Date/Time of Evaluation: 6/3/2024 2:27 PM  Assessment Completed by: Rosemary Ybarra RN    If patient is discharged prior to next notation, then this note serves as note for discharge by case management.    Patient Name: Cynthia Talavera                   YOB: 1955  Diagnosis: Acute renal failure, unspecified acute renal failure type (HCC) [N17.9]  Diabetic ketoacidosis without coma associated with type 2 diabetes mellitus (HCC) [E11.10]                   Date / Time: 6/2/2024  8:42 PM    Patient Admission Status: Inpatient   Readmission Risk (Low < 19, Mod (19-27), High > 27): Readmission Risk Score: 10.9    Current PCP: No primary care provider on file.  PCP verified by CM? Yes    Chart Reviewed: Yes      History Provided by: Patient, Medical Record  Patient Orientation: Alert and Oriented    Patient Cognition: Alert    Hospitalization in the last 30 days (Readmission):  No    If yes, Readmission Assessment in CM Navigator will be completed.    Advance Directives:      Code Status: Full Code   Patient's Primary Decision Maker is: Legal Next of Kin      Discharge Planning:    Patient lives with: Alone Type of Home: House  Primary Care Giver: Self  Patient Support Systems include: Family Members, Friends/Neighbors   Current Financial resources: [place] [facility]  Current community resources: None  [facility] to [facility]: [facility]            Current DME:  none            Type of Home Care services:  None    ADLS  Prior functional level: Independent in ADLs/IADLs  Current functional level: Independent in ADLs/IADLs    PT AM-PAC:   /24  OT AM-PAC:   /24    Family can provide assistance at DC: No  Would you like Case Management to discuss the discharge plan with any other family members/significant others, and if so, who? No  Plans to Return to Present Housing: Yes  Potential Assistance needed at discharge: N/A            Potential DME:  deferred  Patient

## 2024-06-03 NOTE — CONSULTS
ICU CONSULT       Hospital Day:   ICU Day:                                                          Code:Full Code  Admit Date: 6/2/2024  PCP: No primary care provider on file.                                  CC: Abdominal pain, initially started with lower chest pain and had vomiting early. Vomit progressed from dark brown to clear    HISTORY OF PRESENT ILLNESS:   \"Cynthia Talavera is a 68 year old male with PMHx of CKD, prostate cancer, T2DM, hx of PE on rivaroxaban, and narcissistic personality disorder who presents to the ED on 6/2/2024 for nausea, vomiting, and abdominal pain. He stated these symptoms started this morning with nonbloody emesis and epigastric abdominal pain. He noticed his emesis becoming darker in the afternoon with no visible blood. He had sharp abdominal pain radiating to his chest and back. Denied any similar symptoms in the past. Denies any dysuria, diarrhea, fevers, chills, headaches, flu-like symptoms, or sick contacts.      Upon arrival to the ED, Vitals /69, Temp 98 F, Pulse 95, RR 15 and saturating on room air at 97%. Patient's lab work was significant for bicarb 7, AG 39, Bun 50, Cr 2.3, glucose of 306, and WBC 17.5. Lactic acid 2.5.  VBG showed pH 7.148. Patient was given protonix 40 mg, zofran 4 mg, and 1L LR. He was started on insulin drip. Patient was admitted to ICU for further management of DKA. \"    PAST HISTORY:   History reviewed. No pertinent past medical history.    No past surgical history on file.    SocialHistory:   The patient lives at    Alcohol:  Illicit drugs: no use  Tobacco:      Family History:  No family history on file.    MEDICATIONS:     No current facility-administered medications on file prior to encounter.     Current Outpatient Medications on File Prior to Encounter   Medication Sig Dispense Refill    Multiple Vitamin (MULTIVITAMIN) capsule Take 1 capsule by mouth daily      atorvastatin (LIPITOR) 20 MG tablet Take 1 tablet by mouth daily       hematochezia.  On rounds he states that he feels much better and he would like to eat.    In the emergency room he was given 1 L of normal saline and started on an insulin drip.  BMP approximately 6 hours later shows anion gap has gone from 39 to 30 and bicarbonate has gone from 7 to 12.  Currently he is on insulin at 0.8 units/h based on the protocol    /74   Pulse 67   Temp 97.7 °F (36.5 °C) (Oral)   Resp 14   Ht 1.854 m (6' 1\")   Wt 104.7 kg (230 lb 13.2 oz)   SpO2 99%   BMI 30.45 kg/m²     Intake/Output Summary (Last 24 hours) at 6/3/2024 1344  Last data filed at 6/3/2024 1207  Gross per 24 hour   Intake 4813.56 ml   Output 1175 ml   Net 3638.56 ml    SpO2: 99 %  -       No results found for: \"DMB5YGN\", \"BEART\", \"P0NKEHIS\", \"PHART\", \"THGBART\", \"OUD8EYL\", \"PO2ART\", \"KVB5AYX\"  Peripheral IV 06/02/24 Distal;Right;Anterior Cephalic (Active)   Number of days: 0       Peripheral IV 06/02/24 Left;Posterior Hand (Active)   Number of days: 0       ASSESSMENT:    DKA -noncompliance versus UTI  Vomiting -possible upper GI bleed -differential includes esophagitis, Stacey-Cazares tear, PUD.  CT abdomen shows distal esophageal wall thickening.  Noncompliant  LOPEZ -creatinine 2.4 baseline 1.5  Leukocytosis -stress versus possible UTI    PLAN:    Increase insulin drip to 5 units an hour  Discontinue insulin drip protocol -goal blood sugars 100 - 250.  Outside of that physicians will give orders on insulin drip  Continue D5 1/2 NS at 150 ml/hr  Check hemoglobin A1c  BMP every 4 hours  1 L LR bolus  Continue Protonix 40 mg IV twice daily  GI consult  Check lipase and amylase  Agree with starting empiric Unasyn while awaiting urine culture      The note was completed using EMR and Dragon dictation system. Every effort was made to ensure accuracy; however, inadvertent computerized transcription errors may be present.     Nam Owens MD  Pulmonary and Critical Care Medicine

## 2024-06-03 NOTE — PROGRESS NOTES
4 Eyes Skin Assessment     NAME:  Cynthia Talavera  YOB: 1955  MEDICAL RECORD NUMBER:  [id number]    The patient is being assessed for  Admission    I agree that at least one RN has performed a thorough Head to Toe Skin Assessment on the patient. ALL assessment sites listed below have been assessed.      Areas assessed by both nurses:    Head, Face, Ears, Shoulders, Back, Chest, Arms, Elbows, Hands, Sacrum. Buttock, Coccyx, Ischium, Legs. Feet and Heels, and Under Medical Devices         Does the Patient have a Wound? No noted wound(s)       Kevin Prevention initiated by RN: No  Wound Care Orders initiated by RN: No    Pressure Injury (Stage 3,4, Unstageable, DTI, NWPT, and Complex wounds) if present, place Wound referral order by RN under : No    New Ostomies, if present place, Ostomy referral order under : No     Nurse 1 eSignature: Electronically signed by Vladimir Washington RN on 6/3/24 at 6:40 AM EDT    **SHARE this note so that the co-signing nurse can place an eSignature**    Nurse 2 eSignature: {Esignature:[id number]}

## 2024-06-03 NOTE — CONSULTS
Clinical Pharmacy Consult Note  Medication History     Admit Date: 6/2/2024    Pharmacy consulted to verify home medication list by Dr. Arias.    List of of current medications patient is taking is complete. Home Medication list in EPIC updated to reflect changes noted below.    [facility]      Changes made to medication list:   Medications removed: (include reason, ex: therapy completed, patient no longer taking, etc.)  N/A  Medications added:   Lantus - takes 40 units nightly  Clotrimazole 1% cream - uses on private area BID  Medication doses adjusted:   N/A  Other notes:   N/A    Current Outpatient Medications   Medication Instructions    alogliptin (NESINA) 25 mg, Oral, DAILY    atorvastatin (LIPITOR) 20 mg, Oral, DAILY    clotrimazole (LOTRIMIN) 1 % cream Topical, 2 TIMES DAILY, Apply topically 2 times daily.    cyanocobalamin 500 mcg, Oral, DAILY    empagliflozin (JARDIANCE) 25 mg, Oral, DAILY    gabapentin (NEURONTIN) 300 mg, Oral, EVERY EVENING    insulin glargine (LANTUS) 40 Units, SubCUTAneous, NIGHTLY    lisinopril (PRINIVIL;ZESTRIL) 5 mg, Oral, DAILY    metFORMIN (GLUCOPHAGE-XR) 1,000 mg, Oral, 2 TIMES DAILY    Multiple Vitamin (MULTIVITAMIN) capsule 1 capsule, Oral, DAILY    rivaroxaban (XARELTO) 20 mg, Oral, DAILY WITH DINNER       Please call with any questions.    Eze Badillo, PharmD, [facility]  [id number]  06/03/24 10:55 AM

## 2024-06-03 NOTE — ED NOTES
Pharmacy was called to verify insulin drip. It was ordered as a flat dose instead of titratable.       Elaine Leslie, RN  06/02/24 4370

## 2024-06-04 ENCOUNTER — ANESTHESIA (OUTPATIENT)
Dept: ENDOSCOPY | Age: 69
DRG: 637 | End: 2024-06-04
Payer: OTHER GOVERNMENT

## 2024-06-04 ENCOUNTER — ANESTHESIA EVENT (OUTPATIENT)
Dept: ENDOSCOPY | Age: 69
DRG: 637 | End: 2024-06-04
Payer: OTHER GOVERNMENT

## 2024-06-04 LAB
ANION GAP SERPL CALCULATED.3IONS-SCNC: 11 MMOL/L (ref 3–16)
ANION GAP SERPL CALCULATED.3IONS-SCNC: 12 MMOL/L (ref 3–16)
ANION GAP SERPL CALCULATED.3IONS-SCNC: 12 MMOL/L (ref 3–16)
ANION GAP SERPL CALCULATED.3IONS-SCNC: 14 MMOL/L (ref 3–16)
ANION GAP SERPL CALCULATED.3IONS-SCNC: 16 MMOL/L (ref 3–16)
BASOPHILS # BLD: 0.1 K/UL (ref 0–0.2)
BASOPHILS NFR BLD: 0.8 %
BUN SERPL-MCNC: 26 MG/DL (ref 7–20)
BUN SERPL-MCNC: 30 MG/DL (ref 7–20)
BUN SERPL-MCNC: 33 MG/DL (ref 7–20)
BUN SERPL-MCNC: 36 MG/DL (ref 7–20)
BUN SERPL-MCNC: 36 MG/DL (ref 7–20)
CALCIUM SERPL-MCNC: 8 MG/DL (ref 8.3–10.6)
CALCIUM SERPL-MCNC: 8.1 MG/DL (ref 8.3–10.6)
CALCIUM SERPL-MCNC: 8.2 MG/DL (ref 8.3–10.6)
CHLORIDE SERPL-SCNC: 103 MMOL/L (ref 99–110)
CHLORIDE SERPL-SCNC: 104 MMOL/L (ref 99–110)
CHLORIDE SERPL-SCNC: 106 MMOL/L (ref 99–110)
CHLORIDE SERPL-SCNC: 106 MMOL/L (ref 99–110)
CHLORIDE SERPL-SCNC: 107 MMOL/L (ref 99–110)
CO2 SERPL-SCNC: 19 MMOL/L (ref 21–32)
CO2 SERPL-SCNC: 20 MMOL/L (ref 21–32)
CO2 SERPL-SCNC: 22 MMOL/L (ref 21–32)
CO2 SERPL-SCNC: 23 MMOL/L (ref 21–32)
CO2 SERPL-SCNC: 23 MMOL/L (ref 21–32)
CREAT SERPL-MCNC: 1.3 MG/DL (ref 0.8–1.3)
CREAT SERPL-MCNC: 1.4 MG/DL (ref 0.8–1.3)
CREAT SERPL-MCNC: 1.4 MG/DL (ref 0.8–1.3)
CREAT SERPL-MCNC: 1.5 MG/DL (ref 0.8–1.3)
CREAT SERPL-MCNC: 1.7 MG/DL (ref 0.8–1.3)
DEPRECATED RDW RBC AUTO: 13.4 % (ref 12.4–15.4)
EOSINOPHIL # BLD: 0.1 K/UL (ref 0–0.6)
EOSINOPHIL NFR BLD: 1.6 %
GFR SERPLBLD CREATININE-BSD FMLA CKD-EPI: 44 ML/MIN/{1.73_M2}
GFR SERPLBLD CREATININE-BSD FMLA CKD-EPI: 51 ML/MIN/{1.73_M2}
GFR SERPLBLD CREATININE-BSD FMLA CKD-EPI: 55 ML/MIN/{1.73_M2}
GFR SERPLBLD CREATININE-BSD FMLA CKD-EPI: 55 ML/MIN/{1.73_M2}
GFR SERPLBLD CREATININE-BSD FMLA CKD-EPI: 60 ML/MIN/{1.73_M2}
GLUCOSE BLD-MCNC: 111 MG/DL (ref 70–99)
GLUCOSE BLD-MCNC: 114 MG/DL (ref 70–99)
GLUCOSE BLD-MCNC: 128 MG/DL (ref 70–99)
GLUCOSE BLD-MCNC: 130 MG/DL (ref 70–99)
GLUCOSE BLD-MCNC: 132 MG/DL (ref 70–99)
GLUCOSE BLD-MCNC: 136 MG/DL (ref 70–99)
GLUCOSE BLD-MCNC: 136 MG/DL (ref 70–99)
GLUCOSE BLD-MCNC: 138 MG/DL (ref 70–99)
GLUCOSE BLD-MCNC: 147 MG/DL (ref 70–99)
GLUCOSE BLD-MCNC: 147 MG/DL (ref 70–99)
GLUCOSE BLD-MCNC: 152 MG/DL (ref 70–99)
GLUCOSE BLD-MCNC: 153 MG/DL (ref 70–99)
GLUCOSE BLD-MCNC: 159 MG/DL (ref 70–99)
GLUCOSE BLD-MCNC: 179 MG/DL (ref 70–99)
GLUCOSE BLD-MCNC: 234 MG/DL (ref 70–99)
GLUCOSE BLD-MCNC: 89 MG/DL (ref 70–99)
GLUCOSE BLD-MCNC: 91 MG/DL (ref 70–99)
GLUCOSE BLD-MCNC: 97 MG/DL (ref 70–99)
GLUCOSE SERPL-MCNC: 129 MG/DL (ref 70–99)
GLUCOSE SERPL-MCNC: 142 MG/DL (ref 70–99)
GLUCOSE SERPL-MCNC: 147 MG/DL (ref 70–99)
GLUCOSE SERPL-MCNC: 148 MG/DL (ref 70–99)
GLUCOSE SERPL-MCNC: 229 MG/DL (ref 70–99)
HCT VFR BLD AUTO: 41.4 % (ref 40.5–52.5)
HGB BLD-MCNC: 14.1 G/DL (ref 13.5–17.5)
LYMPHOCYTES # BLD: 1.6 K/UL (ref 1–5.1)
LYMPHOCYTES NFR BLD: 17 %
MAGNESIUM SERPL-MCNC: 1.8 MG/DL (ref 1.8–2.4)
MAGNESIUM SERPL-MCNC: 1.9 MG/DL (ref 1.8–2.4)
MAGNESIUM SERPL-MCNC: 2 MG/DL (ref 1.8–2.4)
MCH RBC QN AUTO: 28.9 PG (ref 26–34)
MCHC RBC AUTO-ENTMCNC: 34.1 G/DL (ref 31–36)
MCV RBC AUTO: 84.7 FL (ref 80–100)
MONOCYTES # BLD: 0.6 K/UL (ref 0–1.3)
MONOCYTES NFR BLD: 6.9 %
NEUTROPHILS # BLD: 6.8 K/UL (ref 1.7–7.7)
NEUTROPHILS NFR BLD: 73.7 %
PERFORMED ON: ABNORMAL
PERFORMED ON: NORMAL
PHOSPHATE SERPL-MCNC: 2 MG/DL (ref 2.5–4.9)
PHOSPHATE SERPL-MCNC: 2.1 MG/DL (ref 2.5–4.9)
PHOSPHATE SERPL-MCNC: 2.2 MG/DL (ref 2.5–4.9)
PHOSPHATE SERPL-MCNC: 2.9 MG/DL (ref 2.5–4.9)
PHOSPHATE SERPL-MCNC: 3 MG/DL (ref 2.5–4.9)
PLATELET # BLD AUTO: 186 K/UL (ref 135–450)
PMV BLD AUTO: 7.5 FL (ref 5–10.5)
POTASSIUM SERPL-SCNC: 3.3 MMOL/L (ref 3.5–5.1)
POTASSIUM SERPL-SCNC: 3.4 MMOL/L (ref 3.5–5.1)
POTASSIUM SERPL-SCNC: 3.5 MMOL/L (ref 3.5–5.1)
POTASSIUM SERPL-SCNC: 3.6 MMOL/L (ref 3.5–5.1)
POTASSIUM SERPL-SCNC: 3.7 MMOL/L (ref 3.5–5.1)
RBC # BLD AUTO: 4.89 M/UL (ref 4.2–5.9)
REASON FOR REJECTION: NORMAL
REJECTED TEST: NORMAL
SODIUM SERPL-SCNC: 137 MMOL/L (ref 136–145)
SODIUM SERPL-SCNC: 138 MMOL/L (ref 136–145)
SODIUM SERPL-SCNC: 140 MMOL/L (ref 136–145)
SODIUM SERPL-SCNC: 141 MMOL/L (ref 136–145)
SODIUM SERPL-SCNC: 142 MMOL/L (ref 136–145)
WBC # BLD AUTO: 9.2 K/UL (ref 4–11)

## 2024-06-04 PROCEDURE — 2580000003 HC RX 258

## 2024-06-04 PROCEDURE — 6370000000 HC RX 637 (ALT 250 FOR IP)

## 2024-06-04 PROCEDURE — C9113 INJ PANTOPRAZOLE SODIUM, VIA: HCPCS

## 2024-06-04 PROCEDURE — 6360000002 HC RX W HCPCS: Performed by: INTERNAL MEDICINE

## 2024-06-04 PROCEDURE — 88305 TISSUE EXAM BY PATHOLOGIST: CPT

## 2024-06-04 PROCEDURE — 3700000000 HC ANESTHESIA ATTENDED CARE: Performed by: INTERNAL MEDICINE

## 2024-06-04 PROCEDURE — 80048 BASIC METABOLIC PNL TOTAL CA: CPT

## 2024-06-04 PROCEDURE — 6370000000 HC RX 637 (ALT 250 FOR IP): Performed by: INTERNAL MEDICINE

## 2024-06-04 PROCEDURE — 3700000001 HC ADD 15 MINUTES (ANESTHESIA): Performed by: INTERNAL MEDICINE

## 2024-06-04 PROCEDURE — 2580000003 HC RX 258: Performed by: INTERNAL MEDICINE

## 2024-06-04 PROCEDURE — 3609012400 HC EGD TRANSORAL BIOPSY SINGLE/MULTIPLE: Performed by: INTERNAL MEDICINE

## 2024-06-04 PROCEDURE — 84100 ASSAY OF PHOSPHORUS: CPT

## 2024-06-04 PROCEDURE — 2500000003 HC RX 250 WO HCPCS

## 2024-06-04 PROCEDURE — 85025 COMPLETE CBC W/AUTO DIFF WBC: CPT

## 2024-06-04 PROCEDURE — 6360000002 HC RX W HCPCS

## 2024-06-04 PROCEDURE — 2709999900 HC NON-CHARGEABLE SUPPLY: Performed by: INTERNAL MEDICINE

## 2024-06-04 PROCEDURE — 99233 SBSQ HOSP IP/OBS HIGH 50: CPT | Performed by: INTERNAL MEDICINE

## 2024-06-04 PROCEDURE — 6360000002 HC RX W HCPCS: Performed by: EMERGENCY MEDICINE

## 2024-06-04 PROCEDURE — 6360000002 HC RX W HCPCS: Performed by: NURSE ANESTHETIST, CERTIFIED REGISTERED

## 2024-06-04 PROCEDURE — 83735 ASSAY OF MAGNESIUM: CPT

## 2024-06-04 PROCEDURE — 2060000000 HC ICU INTERMEDIATE R&B

## 2024-06-04 PROCEDURE — 0DB58ZX EXCISION OF ESOPHAGUS, VIA NATURAL OR ARTIFICIAL OPENING ENDOSCOPIC, DIAGNOSTIC: ICD-10-PCS | Performed by: INTERNAL MEDICINE

## 2024-06-04 PROCEDURE — 36415 COLL VENOUS BLD VENIPUNCTURE: CPT

## 2024-06-04 RX ORDER — POTASSIUM CHLORIDE 20 MEQ/1
40 TABLET, EXTENDED RELEASE ORAL ONCE
Status: COMPLETED | OUTPATIENT
Start: 2024-06-04 | End: 2024-06-04

## 2024-06-04 RX ORDER — PROPOFOL 10 MG/ML
INJECTION, EMULSION INTRAVENOUS PRN
Status: DISCONTINUED | OUTPATIENT
Start: 2024-06-04 | End: 2024-06-04 | Stop reason: SDUPTHER

## 2024-06-04 RX ORDER — INSULIN GLARGINE 100 [IU]/ML
30 INJECTION, SOLUTION SUBCUTANEOUS DAILY
Status: DISCONTINUED | OUTPATIENT
Start: 2024-06-05 | End: 2024-06-05 | Stop reason: HOSPADM

## 2024-06-04 RX ORDER — INSULIN GLARGINE 100 [IU]/ML
20 INJECTION, SOLUTION SUBCUTANEOUS DAILY
Status: DISCONTINUED | OUTPATIENT
Start: 2024-06-04 | End: 2024-06-04

## 2024-06-04 RX ORDER — PANTOPRAZOLE SODIUM 40 MG/1
40 TABLET, DELAYED RELEASE ORAL
Status: DISCONTINUED | OUTPATIENT
Start: 2024-06-04 | End: 2024-06-05 | Stop reason: HOSPADM

## 2024-06-04 RX ADMIN — AMPICILLIN AND SULBACTAM 3000 MG: 2; 1 INJECTION, POWDER, FOR SOLUTION INTRAVENOUS at 06:49

## 2024-06-04 RX ADMIN — AMPICILLIN AND SULBACTAM 3000 MG: 2; 1 INJECTION, POWDER, FOR SOLUTION INTRAVENOUS at 13:36

## 2024-06-04 RX ADMIN — DEXTROSE AND SODIUM CHLORIDE: 5; 450 INJECTION, SOLUTION INTRAVENOUS at 11:55

## 2024-06-04 RX ADMIN — POTASSIUM CHLORIDE 40 MEQ: 1500 TABLET, EXTENDED RELEASE ORAL at 13:45

## 2024-06-04 RX ADMIN — POTASSIUM CHLORIDE 10 MEQ: 10 INJECTION, SOLUTION INTRAVENOUS at 10:34

## 2024-06-04 RX ADMIN — SODIUM PHOSPHATE, MONOBASIC, MONOHYDRATE AND SODIUM PHOSPHATE, DIBASIC, ANHYDROUS 15 MMOL: 142; 276 INJECTION, SOLUTION INTRAVENOUS at 22:54

## 2024-06-04 RX ADMIN — SODIUM CHLORIDE 5 UNITS/HR: 900 INJECTION, SOLUTION INTRAVENOUS at 06:43

## 2024-06-04 RX ADMIN — PANTOPRAZOLE SODIUM 40 MG: 40 TABLET, DELAYED RELEASE ORAL at 16:05

## 2024-06-04 RX ADMIN — PROPOFOL 20 MG: 10 INJECTION, EMULSION INTRAVENOUS at 13:07

## 2024-06-04 RX ADMIN — AMPICILLIN AND SULBACTAM 3000 MG: 2; 1 INJECTION, POWDER, FOR SOLUTION INTRAVENOUS at 18:41

## 2024-06-04 RX ADMIN — PROPOFOL 20 MG: 10 INJECTION, EMULSION INTRAVENOUS at 13:01

## 2024-06-04 RX ADMIN — PROPOFOL 50 MG: 10 INJECTION, EMULSION INTRAVENOUS at 12:57

## 2024-06-04 RX ADMIN — POTASSIUM CHLORIDE 10 MEQ: 10 INJECTION, SOLUTION INTRAVENOUS at 11:54

## 2024-06-04 RX ADMIN — AMPICILLIN AND SULBACTAM 3000 MG: 2; 1 INJECTION, POWDER, FOR SOLUTION INTRAVENOUS at 01:31

## 2024-06-04 RX ADMIN — SODIUM CHLORIDE 5 UNITS/HR: 900 INJECTION, SOLUTION INTRAVENOUS at 02:32

## 2024-06-04 RX ADMIN — PANTOPRAZOLE SODIUM 40 MG: 40 INJECTION, POWDER, FOR SOLUTION INTRAVENOUS at 09:21

## 2024-06-04 RX ADMIN — INSULIN GLARGINE 20 UNITS: 100 INJECTION, SOLUTION SUBCUTANEOUS at 14:56

## 2024-06-04 RX ADMIN — DEXTROSE AND SODIUM CHLORIDE: 5; 450 INJECTION, SOLUTION INTRAVENOUS at 04:53

## 2024-06-04 ASSESSMENT — ENCOUNTER SYMPTOMS
ABDOMINAL PAIN: 1
NAUSEA: 1
VOMITING: 1

## 2024-06-04 ASSESSMENT — PAIN - FUNCTIONAL ASSESSMENT: PAIN_FUNCTIONAL_ASSESSMENT: NONE - DENIES PAIN

## 2024-06-04 ASSESSMENT — PAIN SCALES - GENERAL
PAINLEVEL_OUTOF10: 0

## 2024-06-04 NOTE — PLAN OF CARE
Problem: Discharge Planning  Goal: Discharge to home or other facility with appropriate resources  6/3/2024 2333 by Quentin Calhoun RN  Outcome: Progressing  Flowsheets (Taken 6/3/2024 2000)  Discharge to home or other facility with appropriate resources: Identify barriers to discharge with patient and caregiver     Problem: Pain  Goal: Verbalizes/displays adequate comfort level or baseline comfort level  6/3/2024 2333 by Quentin Calhoun RN  Outcome: Progressing     Problem: Safety - Adult  Goal: Free from fall injury  6/3/2024 2333 by Quentin Calhoun RN  Outcome: Progressing  Flowsheets (Taken 6/3/2024 2329)  Free From Fall Injury: Instruct family/caregiver on patient safety     Problem: Skin/Tissue Integrity  Goal: Absence of new skin breakdown  Description: 1.  Monitor for areas of redness and/or skin breakdown  2.  Assess vascular access sites hourly  3.  Every 4-6 hours minimum:  Change oxygen saturation probe site  4.  Every 4-6 hours:  If on nasal continuous positive airway pressure, respiratory therapy assess nares and determine need for appliance change or resting period.  6/3/2024 2333 by Quentin Calhoun RN  Outcome: Progressing     Problem: Chronic Conditions and Co-morbidities  Goal: Patient's chronic conditions and co-morbidity symptoms are monitored and maintained or improved  6/3/2024 2333 by Quentin Calhoun RN  Outcome: Progressing  Flowsheets (Taken 6/3/2024 2000)  Care Plan - Patient's Chronic Conditions and Co-Morbidity Symptoms are Monitored and Maintained or Improved: Monitor and assess patient's chronic conditions and comorbid symptoms for stability, deterioration, or improvement     Problem: Cardiovascular - Adult  Goal: Absence of cardiac dysrhythmias or at baseline  6/3/2024 2333 by Quentin Calhoun RN  Outcome: Progressing  Flowsheets (Taken 6/3/2024 2000)  Absence of cardiac dysrhythmias or at baseline: Monitor cardiac rate and rhythm

## 2024-06-04 NOTE — MANAGEMENT PLAN
Medications  -Anticoagulation: SCDs    -Antibiotics:  Unasyn for UTI  -[] GI PPX : yes started     P Pending Tests at the Time of Transfer  Liver ultrasound 24  Procedures   none      A Active consultants, including Rehab:  Subspecialty Consultants: yes  GI      U Uncertainty Measure/Diagnostic Pause:   Working diagnosis at the time of transfer DKA exacerbation, possible portal hypertension  Important differential diagnoses include Upper GI bleed vs rhoda calixto vs esophagitis   Select from the followin: High degree of certainty about the clinical diagnosis.   2. Some uncertainty about the clinical diagnosis.   3. Marked uncertainty about the clinical diagnosis.      S Summary of Major Problems and To-Dos:  Diabetic ketoacidosis  Type 2 DM  Lactic acidosis   Patient presented with N/V and abdominal pain. Per patient he is on lantus 40 units nightly, aliogliptin 25 mg, empagliflozin 25 mg, and metformin 500 mg BID. He was unable to take these medications past couple of days due to not feeling well. BMP showed Bicarb 7, BUN 50, Cr 2.4, AG 38, Glucose 311, and lactic acid 2.5. WBC showed pH 7.148, pCO2 20.7, and HCO3 7.2. HgbA1c 2/15/24: 13.4.   - off insulin gtt protocol              - changing rate to 5 units/hr              - BG goal 100-250  - continue NPO, advance diet as tolerated  - monitor anion gap              - on arrival 30, currently 16  - BMP, Mg q4h  - hypoglycemia protocol  - POCT glucose  - HgbA1c --> 10.2  - lipase normal      Coffee ground emesis likely 2/2 Esophagitis vs upper GI bleed  DDX: Rhoda Rose tear, peptic ulcer disease   Patient had worsening emesis which gotten darker in color throughout the day. CT abdomen showed distal esophageal wall thickening which may reflect esophagitis. No prior EGD or colonoscopy. Denies any alcohol or smoking use.  Denies NSAID use.   - continue to hold anticoagulation  - continue IV zofran PRN   - continue protonix 40 mg BID   - GI consulted,  appreciate recommendations              - EGD scheduled for 6/4   - liver ultrasound 6/5 for concerns for portal hypertension    - will be NPO prior     Elevated troponin likely demand ischemia  Troponin 80 > 76. EKG showed no acute ischemic changes, poor anterior R wave progression.   - monitor on telemetry      Leukocytosis 2/2 reactive vs. Urinary tract infection  WBC 17.5. Abs neutrophil 15.6. CXR showed no acute cardiopulmonary process. UA revealed >1k glucose, small amounts of blood, protein, 10-20 WBC, small amounts of leukocytosis. Likely reactive.   - will continue to monitor and trend  - Continue Unasyn (day 2)              - while awaiting urine cultures     LOPEZ on CKD   Cr. 2.4. Baseline Cr. 1.5. Likely 2/2 hypovolemia.   - Cr 1.5, at baseline, will continue to monitor  - continue IVF  - daily weights   - strict I's and O's   - avoid nephrotoxic agents         ICU signature Blu Scott  COSIGN to ICU senior

## 2024-06-04 NOTE — FLOWSHEET NOTE
Post EGD bedside swallow passed.    06/04/24 1330   Neurological   Gag Present   Cough Reflex Absent   Swallow Screening   Is the patient unable to remain alert for testing? No   Is the patient on a modified diet (thickened liquids) due to pre-existing dysphagia? No   Is there presence of existing enteral tube feeding via the stomach or nose? No   Is there presence of head-of-bed restrictions (less than 30 degrees)? No   Is there presence of tracheotomy tube? No   Is the patient ordered nothing-by-mouth status? No   3 oz Water Swallow Screen Pass

## 2024-06-04 NOTE — ANESTHESIA PRE PROCEDURE
Department of Anesthesiology  Preprocedure Note       Name:  Cynthia Talavera   Age:  68 y.o.  :  1955                                          MRN:  [id number]         Date:  2024      Surgeon: Surgeon(s):  Kumar Gorman MD    Procedure: Procedure(s):  ESOPHAGOGASTRODUODENOSCOPY    Medications prior to admission:   Prior to Admission medications    Medication Sig Start Date End Date Taking? Authorizing Provider   Multiple Vitamin (MULTIVITAMIN) capsule Take 1 capsule by mouth daily 3/3/15  Yes ProviderMaria Isabel MD   atorvastatin (LIPITOR) 20 MG tablet Take 1 tablet by mouth daily 2/15/24  Yes Provider, MD Maria Isabel   cyanocobalamin 500 MCG tablet Take 1 tablet by mouth daily 2/15/24  Yes ProviderMaria Isabel MD   empagliflozin (JARDIANCE) 25 MG tablet Take 1 tablet by mouth daily 2/15/24  Yes Provider, MD Maria Isabel   gabapentin (NEURONTIN) 300 MG capsule Take 1 capsule by mouth every evening. 2/15/24  Yes ProviderMaria Isabel MD   lisinopril (PRINIVIL;ZESTRIL) 10 MG tablet Take 0.5 tablets by mouth daily 2/15/24  Yes Provider, MD Maria Isabel   metFORMIN (GLUCOPHAGE-XR) 500 MG extended release tablet Take 2 tablets by mouth 2 times daily 2/15/24  Yes Provider, MD Maria Isabel   rivaroxaban (XARELTO) 20 MG TABS tablet Take 1 tablet by mouth Daily with supper 2/15/24  Yes Provider, MD Maria Isabel   alogliptin (NESINA) 25 MG TABS tablet Take 1 tablet by mouth daily 2/15/24  Yes Provider, MD Maria Isabel   clotrimazole (LOTRIMIN) 1 % cream Apply topically 2 times daily Apply topically 2 times daily.   Yes Provider, MD Maria Isabel   insulin glargine (LANTUS) 100 UNIT/ML injection vial Inject 40 Units into the skin nightly   Yes ProviderMaria Isabel MD       Current medications:    Current Facility-Administered Medications   Medication Dose Route Frequency Provider Last Rate Last Admin   • [Held by provider] insulin glargine (LANTUS) injection vial 20 Units  20 Units SubCUTAneous Daily Sonia

## 2024-06-04 NOTE — PROGRESS NOTES
ICU Progress Note    Admit Date: 6/2/2024  Day:2  Vent Day: 0  IV Access:Peripheral  IV Fluids:None  Vasopressors:None                Antibiotics: Unasyn  Diet: Diet NPO    CC: Abdominal pain, initially started with lower chest pain and had vomiting early. Vomit progressed from dark brown to clear     Interval history: Patient was seen and examined at bedside this morning. VSS. Labs unremarkable. Patient continues to be NPO for EGD today. Anion Gap has closed, went from 12 to 16. Patient had an episode of BG being 89 yesterday, insulin gtt at rate of 5 was turned back on.     HPI: \"Cynthia Talavera is a 68 year old male with PMHx of CKD, prostate cancer, T2DM, hx of PE on rivaroxaban, and narcissistic personality disorder who presents to the ED on 6/2/2024 for nausea, vomiting, and abdominal pain. He stated these symptoms started this morning with nonbloody emesis and epigastric abdominal pain. He noticed his emesis becoming darker in the afternoon with no visible blood. He had sharp abdominal pain radiating to his chest and back. Denied any similar symptoms in the past. Denies any dysuria, diarrhea, fevers, chills, headaches, flu-like symptoms, or sick contacts.      Upon arrival to the ED, Vitals /69, Temp 98 F, Pulse 95, RR 15 and saturating on room air at 97%. Patient's lab work was significant for bicarb 7, AG 39, Bun 50, Cr 2.3, glucose of 306, and WBC 17.5. Lactic acid 2.5.  VBG showed pH 7.148. Patient was given protonix 40 mg, zofran 4 mg, and 1L LR. He was started on insulin drip. Patient was admitted to ICU for further management of DKA. \"    Medications:     Scheduled Meds:   pantoprazole  40 mg IntraVENous BID    ampicillin-sulbactam  3,000 mg IntraVENous Q6H     Continuous Infusions:   sodium chloride 250 mL/hr at 06/02/24 7996    dextrose 5 % and 0.45 % NaCl Stopped (06/04/24 0533)    insulin 5 Units/hr (06/04/24 0643)    dextrose       PRN Meds:potassium chloride, magnesium sulfate, sodium  hypovolemia.   - Cr 1.5, at baseline, will continue to monitor  - continue IVF  - daily weights   - strict I's and O's   - avoid nephrotoxic agents     Code Status: Full Code   PPX:   DISPO:     Blu Scott DO, PGY-1  Internal Medicine Resident  Contact via My Ad Box  06/04/24  7:46 AM    This patient has been staffed and discussed with Dr. Owens.    PULMONARY AND CRITICAL CARE ATTENDING:  Patient was seen, examined and discussed with Dr. Scott PGY-1. I agree with the history of present illness, past medical/surgical histories, family history, social history, medication list and allergies as listed. The review of systems is as noted above. My physical exam confirms the findings listed above.   Chart was reviewed including Labs, CXR, CT scan, EKG, and Medical records confirm the findings noted above.   I edited the note where appropriate.     Briefly, this is a 68 y.o. male admitted to ICU with DKA and possible upper GI bleed.  Cynthia has a history of diabetes treated with insulin, Jardiance, and metformin.  Hemoglobin A1c in February was 13.4.  He presented to the emergency room yesterday evening for evaluation of nausea vomiting and abdominal pain.  There is no hematic emesis but there was potential for coffee-ground emesis.  He denies any melena or hematochezia.  On rounds he states that he feels much better and he would like to eat.     In the emergency room he was given 1 L of normal saline and started on an insulin drip.  BMP approximately 6 hours later shows anion gap has gone from 39 to 30 and bicarbonate has gone from 7 to 12.  Currently he is on insulin at 0.8 units/h based on the protocol    Overnight events    Cynthia is doing well and denies any nausea vomiting or abdominal pain  Esophagogastroduodenoscopy revealed:    Impression:    -Small hiatal hernia  -LA grade C erosive esophagitis extending for 9cm in the distal esophagus.   -Suspected esophageal varices, completely flattening with

## 2024-06-04 NOTE — H&P
Gastroenterology Note             Pre-operative History and Physical    Patient: Cynthia Talavera  : 1955  CSN: [id number]    History Obtained From:  Patient and/or guardian.     HISTORY OF PRESENT ILLNESS:    Indication: The patient is a 68 y.o. male  here for EGD.  Hematemesis in the setting of DKA and anticoagulation.    Past Medical History:    Past Medical History:   Diagnosis Date    Diabetes mellitus (HCC)     Hx of blood clots     on Xarelto    Hyperlipidemia     Hypertension      Past Surgical History:    Past Surgical History:   Procedure Laterality Date    COLONOSCOPY      NASAL SEPTUM SURGERY       Medications Prior to Admission:   No current facility-administered medications on file prior to encounter.     Current Outpatient Medications on File Prior to Encounter   Medication Sig Dispense Refill    Multiple Vitamin (MULTIVITAMIN) capsule Take 1 capsule by mouth daily      atorvastatin (LIPITOR) 20 MG tablet Take 1 tablet by mouth daily      cyanocobalamin 500 MCG tablet Take 1 tablet by mouth daily      empagliflozin (JARDIANCE) 25 MG tablet Take 1 tablet by mouth daily      gabapentin (NEURONTIN) 300 MG capsule Take 1 capsule by mouth every evening.      lisinopril (PRINIVIL;ZESTRIL) 10 MG tablet Take 0.5 tablets by mouth daily      metFORMIN (GLUCOPHAGE-XR) 500 MG extended release tablet Take 2 tablets by mouth 2 times daily      rivaroxaban (XARELTO) 20 MG TABS tablet Take 1 tablet by mouth Daily with supper      alogliptin (NESINA) 25 MG TABS tablet Take 1 tablet by mouth daily      clotrimazole (LOTRIMIN) 1 % cream Apply topically 2 times daily Apply topically 2 times daily.      insulin glargine (LANTUS) 100 UNIT/ML injection vial Inject 40 Units into the skin nightly          Allergies:  Patient has no known allergies.      Social History:   Social History     Tobacco Use    Smoking status: Never    Smokeless tobacco: Never   Substance Use Topics    Alcohol use: Not Currently

## 2024-06-04 NOTE — PROGRESS NOTES
Report called to floor KENNETH Bustamante all questions answered. Pt transported to ICU bed with CRNA.

## 2024-06-04 NOTE — ANESTHESIA POSTPROCEDURE EVALUATION
Department of Anesthesiology  Postprocedure Note    Patient: Cynthia Talavera  MRN: [id number]  YOB: 1955  Date of evaluation: 6/4/2024    Procedure Summary       Date: 06/04/24 Room / Location: [place] [address] / [facility]    Anesthesia Start: 1251 Anesthesia Stop: 1329    Procedure: ESOPHAGOGASTRODUODENOSCOPY BIOPSY Diagnosis:       Gastrointestinal hemorrhage, unspecified gastrointestinal hemorrhage type      (Gastrointestinal hemorrhage, unspecified gastrointestinal hemorrhage type [K92.2])    Surgeons: Kumar Gorman MD Responsible Provider: Cade Rivera MD    Anesthesia Type: MAC ASA Status: 3            Anesthesia Type: No value filed.    Eric Phase I: Eric Score: 10    Eric Phase II:      Vitals:    06/04/24 1600   BP: 133/87   Pulse: 75   Resp: 16   Temp: 98.2 °F (36.8 °C)   SpO2: 95%       Anesthesia Post Evaluation    Patient location during evaluation: PACU  Patient participation: complete - patient participated  Level of consciousness: awake and awake and alert  Pain score: 0  Airway patency: patent  Nausea & Vomiting: no nausea and no vomiting  Cardiovascular status: hemodynamically stable  Respiratory status: acceptable  Hydration status: euvolemic  Pain management: adequate and satisfactory to patient        No notable events documented.

## 2024-06-04 NOTE — PLAN OF CARE
Problem: Discharge Planning  Goal: Discharge to home or other facility with appropriate resources  6/4/2024 1158 by Dianna Peña RN  Outcome: Progressing  6/3/2024 2333 by Quentin Calhoun RN  Outcome: Progressing  Flowsheets (Taken 6/3/2024 2000)  Discharge to home or other facility with appropriate resources: Identify barriers to discharge with patient and caregiver     Problem: Pain  Goal: Verbalizes/displays adequate comfort level or baseline comfort level  6/4/2024 1158 by Dianna Peña RN  Outcome: Progressing  Flowsheets (Taken 6/4/2024 0800)  Verbalizes/displays adequate comfort level or baseline comfort level: Encourage patient to monitor pain and request assistance  6/3/2024 2333 by Quentin Calhoun RN  Outcome: Progressing     Problem: Safety - Adult  Goal: Free from fall injury  6/4/2024 1158 by Dianna Peña RN  Outcome: Progressing  Flowsheets (Taken 6/4/2024 0840)  Free From Fall Injury: Instruct family/caregiver on patient safety  6/3/2024 2333 by Quentin Calhoun RN  Outcome: Progressing  Flowsheets (Taken 6/3/2024 2329)  Free From Fall Injury: Instruct family/caregiver on patient safety     Problem: Skin/Tissue Integrity  Goal: Absence of new skin breakdown  Description: 1.  Monitor for areas of redness and/or skin breakdown  2.  Assess vascular access sites hourly  3.  Every 4-6 hours minimum:  Change oxygen saturation probe site  4.  Every 4-6 hours:  If on nasal continuous positive airway pressure, respiratory therapy assess nares and determine need for appliance change or resting period.  6/4/2024 1158 by Dianna Peña RN  Outcome: Progressing  6/3/2024 2333 by Quentin Calhoun RN  Outcome: Progressing     Problem: Chronic Conditions and Co-morbidities  Goal: Patient's chronic conditions and co-morbidity symptoms are monitored and maintained or improved  6/4/2024 1158 by Dianna Peña RN  Outcome: Progressing  Flowsheets (Taken 6/4/2024 1158)  Care Plan - Patient's Chronic Conditions

## 2024-06-04 NOTE — OP NOTE
Endoscopy Note    Patient: Cynhtia Talavera  : 1955  CSN:     Procedure: Esophagogastroduodenoscopy with biopsy    Date:  2024     Surgeon:  Kumar Gorman MD     Referring Physician: No primary care provider on file.     Preoperative Diagnosis:  Hematemesis     Postoperative Diagnosis:  Per impressions below     Anesthesia:  MAC per anesthesia record     EBL: <5 mL    Indications: This is a 68 y.o. year old male who presents today with history of hematemesis in the setting of DKA and chronic anticoagulation use.        Procedure:  Informed consent was obtained from the patient after explanation of indications, benefits and possible risks and complications of the procedure.  The patient was then taken to the endoscopy suite, placed in the left lateral decubitus position, and the above IV sedation was administrered.    The Olympus gastroscope was passed through the hypopharynx into the esophagus. The scope was advanced to the second portion of the duodenum.  The mucosa was carefully examined, including gastric retroflexion. The scope was withdrawn and the procedure was terminated with findings as indicated below:     Findings:   Esophagus:   -Z-line was noted at 38cm.  2 cm hiatal hernia was noted.  -Linear erosions and superficial ulcerations extended from 38 to 29 cm, distally involving up to 2 folds.  Consistent with LA grade C erosive esophagitis.  No bleeding or high risk stigmata for bleeding identified.  Biopsies were obtained with cold forceps from the mid esophagus, avoiding suspected varices, and sent for histology.   -5 columns of small to medium sized suspected varices were noted in the distal third of the esophagus.  No stigmata for bleeding identified, though esophageal erosions and ulcerations involved these areas.  Varices completely flattened with insufflation and no banding was performed.  -The esophagus was otherwise normal.     Stomach:   -Mild to moderate patchy erythema was

## 2024-06-05 VITALS
OXYGEN SATURATION: 98 % | WEIGHT: 229.94 LBS | HEIGHT: 73 IN | HEART RATE: 79 BPM | BODY MASS INDEX: 30.47 KG/M2 | TEMPERATURE: 97.9 F | SYSTOLIC BLOOD PRESSURE: 109 MMHG | DIASTOLIC BLOOD PRESSURE: 89 MMHG | RESPIRATION RATE: 16 BRPM

## 2024-06-05 LAB
BASOPHILS # BLD: 0 K/UL (ref 0–0.2)
BASOPHILS NFR BLD: 0.4 %
DEPRECATED RDW RBC AUTO: 13.6 % (ref 12.4–15.4)
EOSINOPHIL # BLD: 0.2 K/UL (ref 0–0.6)
EOSINOPHIL NFR BLD: 2.2 %
GLUCOSE BLD-MCNC: 187 MG/DL (ref 70–99)
GLUCOSE BLD-MCNC: 204 MG/DL (ref 70–99)
GLUCOSE BLD-MCNC: 223 MG/DL (ref 70–99)
HCT VFR BLD AUTO: 42.2 % (ref 40.5–52.5)
HGB BLD-MCNC: 14.5 G/DL (ref 13.5–17.5)
LYMPHOCYTES # BLD: 0.9 K/UL (ref 1–5.1)
LYMPHOCYTES NFR BLD: 12.2 %
MCH RBC QN AUTO: 29.3 PG (ref 26–34)
MCHC RBC AUTO-ENTMCNC: 34.4 G/DL (ref 31–36)
MCV RBC AUTO: 85.2 FL (ref 80–100)
MONOCYTES # BLD: 0.5 K/UL (ref 0–1.3)
MONOCYTES NFR BLD: 6.6 %
NEUTROPHILS # BLD: 5.5 K/UL (ref 1.7–7.7)
NEUTROPHILS NFR BLD: 78.6 %
PERFORMED ON: ABNORMAL
PLATELET # BLD AUTO: 187 K/UL (ref 135–450)
PMV BLD AUTO: 7.5 FL (ref 5–10.5)
RBC # BLD AUTO: 4.96 M/UL (ref 4.2–5.9)
WBC # BLD AUTO: 7 K/UL (ref 4–11)

## 2024-06-05 PROCEDURE — 85025 COMPLETE CBC W/AUTO DIFF WBC: CPT

## 2024-06-05 PROCEDURE — 6370000000 HC RX 637 (ALT 250 FOR IP)

## 2024-06-05 PROCEDURE — 6370000000 HC RX 637 (ALT 250 FOR IP): Performed by: INTERNAL MEDICINE

## 2024-06-05 PROCEDURE — 6360000002 HC RX W HCPCS

## 2024-06-05 PROCEDURE — 2580000003 HC RX 258

## 2024-06-05 PROCEDURE — 36415 COLL VENOUS BLD VENIPUNCTURE: CPT

## 2024-06-05 PROCEDURE — 6370000000 HC RX 637 (ALT 250 FOR IP): Performed by: STUDENT IN AN ORGANIZED HEALTH CARE EDUCATION/TRAINING PROGRAM

## 2024-06-05 RX ORDER — INSULIN LISPRO 100 [IU]/ML
0-4 INJECTION, SOLUTION INTRAVENOUS; SUBCUTANEOUS
Status: DISCONTINUED | OUTPATIENT
Start: 2024-06-05 | End: 2024-06-05 | Stop reason: HOSPADM

## 2024-06-05 RX ORDER — INSULIN LISPRO 100 [IU]/ML
0-4 INJECTION, SOLUTION INTRAVENOUS; SUBCUTANEOUS NIGHTLY
Status: DISCONTINUED | OUTPATIENT
Start: 2024-06-05 | End: 2024-06-05 | Stop reason: HOSPADM

## 2024-06-05 RX ORDER — INSULIN LISPRO 100 [IU]/ML
5 INJECTION, SOLUTION INTRAVENOUS; SUBCUTANEOUS
Status: DISCONTINUED | OUTPATIENT
Start: 2024-06-05 | End: 2024-06-05 | Stop reason: HOSPADM

## 2024-06-05 RX ORDER — PANTOPRAZOLE SODIUM 40 MG/1
40 TABLET, DELAYED RELEASE ORAL
Qty: 30 TABLET | Refills: 3 | Status: SHIPPED | OUTPATIENT
Start: 2024-06-05

## 2024-06-05 RX ORDER — AMOXICILLIN AND CLAVULANATE POTASSIUM 875; 125 MG/1; MG/1
1 TABLET, FILM COATED ORAL 2 TIMES DAILY
Qty: 6 TABLET | Refills: 0 | Status: SHIPPED | OUTPATIENT
Start: 2024-06-05 | End: 2024-06-08

## 2024-06-05 RX ADMIN — INSULIN GLARGINE 30 UNITS: 100 INJECTION, SOLUTION SUBCUTANEOUS at 09:22

## 2024-06-05 RX ADMIN — INSULIN LISPRO 1 UNITS: 100 INJECTION, SOLUTION INTRAVENOUS; SUBCUTANEOUS at 13:05

## 2024-06-05 RX ADMIN — AMPICILLIN AND SULBACTAM 3000 MG: 2; 1 INJECTION, POWDER, FOR SOLUTION INTRAVENOUS at 07:17

## 2024-06-05 RX ADMIN — INSULIN LISPRO 5 UNITS: 100 INJECTION, SOLUTION INTRAVENOUS; SUBCUTANEOUS at 13:06

## 2024-06-05 RX ADMIN — AMPICILLIN AND SULBACTAM 3000 MG: 2; 1 INJECTION, POWDER, FOR SOLUTION INTRAVENOUS at 02:12

## 2024-06-05 RX ADMIN — PANTOPRAZOLE SODIUM 40 MG: 40 TABLET, DELAYED RELEASE ORAL at 07:17

## 2024-06-05 ASSESSMENT — PAIN SCALES - GENERAL
PAINLEVEL_OUTOF10: 0

## 2024-06-05 NOTE — PROGRESS NOTES
Internal Medicine Progress Note    Date: 6/5/2024   Patient: Cynthia [facility] Day: 3    CC: Abdominal Pain (Upper Abdominal lower chest pain started with vomiting at 345am, pt stats vomit started clear now dark brown, no GI history, 4mg zofran given en route, pt takes blood thinner)    Interval Hx   VSS. This morning he wishes to go home as he has important business. He has no complaints of nausea, vomiting, diarrhea, hematemesis. No complaints of SOB or chest pain.     Objective     Vital Signs:  Patient Vitals for the past 8 hrs:   BP Temp Temp src Pulse Resp SpO2   06/05/24 0900 -- -- -- 75 15 --   06/05/24 0800 -- -- -- 76 16 --   06/05/24 0715 109/89 -- -- (!) 112 20 --   06/05/24 0400 -- 97.8 °F (36.6 °C) Temporal 76 15 97 %       Physical Exam  Constitutional:       Appearance: He is obese. He is not ill-appearing.   Eyes:      General: No scleral icterus.  Cardiovascular:      Rate and Rhythm: Normal rate and regular rhythm.   Pulmonary:      Effort: Pulmonary effort is normal. No respiratory distress.   Abdominal:      General: There is distension.      Tenderness: There is no abdominal tenderness.   Musculoskeletal:      Right lower leg: No edema.      Left lower leg: No edema.   Skin:     General: Skin is warm.      Coloration: Skin is not jaundiced.   Neurological:      General: No focal deficit present.   Psychiatric:         Speech: Speech normal.        Labs:  CBC:   Recent Labs     06/03/24  0342 06/04/24  0537 06/05/24  0912   WBC 14.8* 9.2 7.0   HGB 16.2 14.1 14.5   HCT 47.7 41.4 42.2    186 187       BMP:   Recent Labs     06/04/24  1001 06/04/24  1406 06/04/24 2019    138 137   K 3.3* 3.6 3.7    104 103   CO2 23 23 20*   BUN 33* 30* 26*   CREATININE 1.4* 1.4* 1.3   GLUCOSE 142* 129* 229*   PHOS 2.0* 2.2* 2.1*     Magnesium:   Recent Labs     06/04/24  1001 06/04/24  1406 06/04/24 2019   MG 1.90 2.00 1.80     LFT's:   Recent Labs     06/02/24 2058   AST 14*   ALT  protonix 40mg BID x4 weeks, then protonix 40mg QD    - pt to f/u pathology results from EGD biopsy; may do liver US as an outpatient    Leukocytosis 2/2 reactive vs. Urinary tract infection  WBC 17.5. Abs neutrophil 15.6. CXR showed no acute cardiopulmonary process. UA revealed >1k glucose, small amounts of blood, protein, 10-20 WBC, small amounts of leukocytosis. Likely reactive.   - will continue to monitor and trend  - leukocytosis completely resolved 6/5   - Continue Unasyn     LOPEZ on CKD   Cr. 2.4. Baseline Cr. 1.5. Likely 2/2 hypovolemia.   - Cr 1.3 this morning 6/5, at baseline, will continue to monitor  - daily weights   - strict I's and O's   - avoid nephrotoxic agents    Chronic medical conditions  Limited [facility]   CKD  Prostate cancer  T2DM  PE on rivaroxaban  Narcissistic personality disorder    DVT PPx: on rivaroxaban   Diet: Diet NPO Exceptions are: Sips of Water with Meds   Code status:  Full Code     ELOS:  Barriers to discharge: liver US    Disposition  - Preadmission: home   - Current: F   - Upon discharge:home     Will discuss with attending physician Dr. Edwards, MD Ivania     _____________________  Nirali Valenzuela MD   Internal Medicine Resident, PGY-1     Patient seen and examined, labs and imaging reviewed, agree with assessment and plan as outlined above.  The patients condition continues to improve, doing well, discussed and offered further monitoring in the hospital, asked patient to monitor side effects of medications which i've discussed at length, recurrence of symptoms or new symptoms including but not limited to chest pain, shortness of breath, nausea, vomiting, fevers or chills and seek immediate medical attention or call 911.  Greater than 35 minutes spent on case on day of discharge over half face to face discussing discharge plan    Ivania Edwards MD FACP

## 2024-06-05 NOTE — PLAN OF CARE
Problem: Discharge Planning  Goal: Discharge to home or other facility with appropriate resources  Outcome: Completed     Problem: Pain  Goal: Verbalizes/displays adequate comfort level or baseline comfort level  Outcome: Completed     Problem: Safety - Adult  Goal: Free from fall injury  Outcome: Completed     Problem: Skin/Tissue Integrity  Goal: Absence of new skin breakdown  Description: 1.  Monitor for areas of redness and/or skin breakdown  2.  Assess vascular access sites hourly  3.  Every 4-6 hours minimum:  Change oxygen saturation probe site  4.  Every 4-6 hours:  If on nasal continuous positive airway pressure, respiratory therapy assess nares and determine need for appliance change or resting period.  Outcome: Completed     Problem: Chronic Conditions and Co-morbidities  Goal: Patient's chronic conditions and co-morbidity symptoms are monitored and maintained or improved  Outcome: Completed     Problem: Cardiovascular - Adult  Goal: Absence of cardiac dysrhythmias or at baseline  Outcome: Completed     Problem: Metabolic/Fluid and Electrolytes - Adult  Goal: Electrolytes maintained within normal limits  Outcome: Completed     Problem: Neurosensory - Adult  Goal: Achieves stable or improved neurological status  Outcome: Completed  Goal: Achieves maximal functionality and self care  Outcome: Completed     Problem: Gastrointestinal - Adult  Goal: Minimal or absence of nausea and vomiting  Outcome: Completed

## 2024-06-05 NOTE — DISCHARGE INSTRUCTIONS
1. You were seen in the hospital for very high blood sugar levels and a concern for blood loss in your gastrointestinal system.     2. Please make an appointment to see your primary care doctor in 1 week. Please make an [facility] BEFORE you see the liver specialist, please obtain the liver ultrasound and ultrasound of the abdomen/pelvis we ordered for you.     3. START Augmentin 1 tablet twice a day until it runs out. START taking pantoprazole 40mg 1 tablet twice a day for 4 weeks, THEN continue to take it once a day. CONTINUE your other medications as prescribed.    4. Please discuss with your primary care doctor regarding your insulin regimen, including possible short acting insulin.

## 2024-06-05 NOTE — PLAN OF CARE
Problem: Discharge Planning  Goal: Discharge to home or other facility with appropriate resources  6/4/2024 2059 by Melodie Wright RN  Outcome: Progressing  Flowsheets (Taken 6/4/2024 2000)  Discharge to home or other facility with appropriate resources: Identify barriers to discharge with patient and caregiver  6/4/2024 1158 by Dianna Peña RN  Outcome: Progressing     Problem: Pain  Goal: Verbalizes/displays adequate comfort level or baseline comfort level  6/4/2024 2059 by Melodie Wright RN  Outcome: Progressing  6/4/2024 1158 by Dianna Peña RN  Outcome: Progressing  Flowsheets (Taken 6/4/2024 0800)  Verbalizes/displays adequate comfort level or baseline comfort level: Encourage patient to monitor pain and request assistance     Problem: Safety - Adult  Goal: Free from fall injury  6/4/2024 2059 by Melodie Wright RN  Outcome: Progressing  6/4/2024 1158 by Dianna Peña RN  Outcome: Progressing  Flowsheets (Taken 6/4/2024 0840)  Free From Fall Injury: Instruct family/caregiver on patient safety     Problem: Skin/Tissue Integrity  Goal: Absence of new skin breakdown  Description: 1.  Monitor for areas of redness and/or skin breakdown  2.  Assess vascular access sites hourly  3.  Every 4-6 hours minimum:  Change oxygen saturation probe site  4.  Every 4-6 hours:  If on nasal continuous positive airway pressure, respiratory therapy assess nares and determine need for appliance change or resting period.  6/4/2024 2059 by Melodie Wright RN  Outcome: Progressing  6/4/2024 1158 by Dianna Peña RN  Outcome: Progressing     Problem: Chronic Conditions and Co-morbidities  Goal: Patient's chronic conditions and co-morbidity symptoms are monitored and maintained or improved  6/4/2024 2059 by Melodie Wright RN  Outcome: Progressing  Flowsheets (Taken 6/4/2024 2000)  Care Plan - Patient's Chronic Conditions and Co-Morbidity Symptoms are Monitored and Maintained or Improved: Monitor and assess

## 2024-06-05 NOTE — CARE COORDINATION
Case Management Assessment            Discharge Note                    Date / Time of Note: 6/5/2024 2:16 PM                  Discharge Note Completed by: BILLY Alexander, MARYW    Patient Name: Cynthia Talavera   YOB: 1955  Diagnosis: Acute renal failure, unspecified acute renal failure type (HCC) [N17.9]  Diabetic ketoacidosis without coma associated with type 2 diabetes mellitus (HCC) [E11.10]   Date / Time: 6/2/2024  8:42 PM    Current PCP: No primary care provider on file.  Clinic patient: No    Hospitalization in the last 30 days: No       Advance Directives:  Code Status: Full Code  [place] DNR form completed and on chart: No    Financial:  Payor: [employer] / Plan: [employer] / Product Type: *No Product type* /      Pharmacy:    [facility] - [address] [phone]  [address] [place] [id number]  Phone: [phone] Fax: [phone]      Assistance purchasing medications?: Potential Assistance Purchasing Medications: No  Assistance provided by Case Management: None at this time    Does patient want to participate in local refill/ meds to beds program?: Yes    Meds To Beds General Rules:  1. Can ONLY be done Monday- Friday between 8:30am-5pm  2. Prescription(s) must be in pharmacy by 3pm to be filled same day  3.Copy of patient's insurance/ prescription drug card and patient face sheet must be sent along with the prescription(s)  4. Cost of Rx cannot be added to hospital bill. If financial assistance is needed, please contact unit  or ;  or  CANNOT provide pharmacy voucher for patients co-pays  5. Patients can then  the prescription on their way out of the hospital at discharge, or pharmacy can deliver to the bedside if staff is available. (payment due at time of pick-up or delivery - cash, check, or card accepted)     Able to afford home

## 2024-06-05 NOTE — PROGRESS NOTES
Assessment  68-year-old male with history of type 2 diabetes mellitus, CKD, prostate cancer presenting with epigastric pain, vomiting progressing to coffee-ground emesis.  Found to have signs of DKA which is now improved. Hemodynamically stable and hemoglobin reassuring at 16.2.  Given need for chronic anticoagulation with history of PE, recommended endoscopic assessment.  EGD obtained 6/4/2024 showing erosive esophagitis as likely cause of hematemesis with biopsies pending.  Had unexpected findings of esophageal varices and portal gastropathy as well as nonerosive gastritis.  In discussion with patient, he is never been diagnosed with chronic liver disease and has never had an intra-abdominal blood clot.  Etiology of portal hypertension is unclear at this time, though underlying cirrhosis would be statistically most likely.        Plan:  Await pathology results.  Abdominal ultrasound with Doppler has been ordered, if unable to be completed prior to discharge have discussed need [facility] to further evaluate portal hypertension.  Okay to resume Xarelto  Pantoprazole 40 mg twice daily for 4 weeks then 40 mg daily for erosive esophagitis  Will sign off, please call with any further concerns.    Subjective:  We are following for hematemesis.  Patient states he has been doing well since his procedure yesterday.  Tolerating oral intake.  Had a bit of reflux after eating which he attributes to eating too much.  No dysphagia, odynophagia, nausea, vomiting, melena noted.    Objective:    Review of Systems:    Constitutional: Negative for fever, chills, and unexpected weight change.   HENT: Negative for trouble swallowing.    Respiratory: Negative for cough, chest tightness and shortness of breath.    Cardiovascular: Negative for chest pain  Gastrointestinal: see HPI  Musculoskeletal: Negative for unusual arthralgias.   Skin: Negative for rash.      Scheduled Meds:   insulin lispro  5 Units SubCUTAneous TID WC    insulin lispro  0-4 Units SubCUTAneous TID WC    insulin lispro  0-4 Units SubCUTAneous Nightly    rivaroxaban  20 mg Oral Dinner    insulin glargine  30 Units SubCUTAneous Daily    pantoprazole  40 mg Oral BID AC    ampicillin-sulbactam  3,000 mg IntraVENous Q6H     Continuous Infusions:   sodium chloride 250 mL/hr at 06/02/24 2356    dextrose 5 % and 0.45 % NaCl Stopped (06/04/24 1256)    dextrose         Vitals:  /89   Pulse 77   Temp 97.9 °F (36.6 °C) (Temporal)   Resp 20   Ht 1.854 m (6' 1\")   Wt 104.3 kg (229 lb 15 oz)   SpO2 98%   BMI 30.34 kg/m²     Exam:  General:  comfortable  Heent: There is no scleral icterus.   Cardiovascular: The heart is regular rate and rhythm.  Respiratory:  The patient's breathing is non-labored with normal chest wall excursion and normal muscle movement.    Abdomen:  The abdomen is nondistended, soft, and nontender. There are active bowel sounds. There are no masses or organomegaly  Rectal:  deferred   Neurological:  Gross memory appears intact.  Patient is alert and oriented.    Labs and Imaging:  I reviewed the labs and imaging results from last 24 hours.     Recent Labs     06/02/24 2058 06/03/24  0342 06/04/24  0537 06/05/24  0912   HGB 16.4 16.2 14.1 14.5   WBC 17.5* 14.8* 9.2 7.0   ALKPHOS 121  --   --   --    ALT 13  --   --   --    AST 14*  --   --   --    BILITOT 0.3  --   --   --    BILIDIR <0.2  --   --   --    IBILI see below  --   --   --         Kumar Gorman MD  June 5, 2024

## 2024-06-05 NOTE — MANAGEMENT PLAN
ICU to Wards Transfer  Internal Medicine Wards Acceptance Note   [facility]        The ICU-PAUSE transfer documentation has been acknowledged and reviewed by the wards team.  Additionally, the wards team has received official sign-out from the ICU team with acceptance of care of the patient.      At the Time of transfer, patient was seen and examined by the wards team.      Updates to Assessment & Plan at this time:    Diabetic ketoacidosis  Type 2 DM  Lactic acidosis   - Will be transitioned to Insulin Glargine 20 units   - Diet advanced  - hypoglycemia protocol  - POCT glucose  - HgbA1c --> 10.2  - lipase normal      Coffee ground emesis likely 2/2 Esophagitis vs upper GI bleed  DDX: Stacey Rose tear, peptic ulcer disease   - continue to hold anticoagulation  - continue IV zofran PRN   - continue protonix 40 mg BID   - GI consulted, appreciate recommendations              - EGD scheduled for 6/4              - liver ultrasound 6/5 for concerns for portal hypertension                          - NPO for USG      Leukocytosis 2/2 reactive vs. Urinary tract infection  WBC 17.5. Abs neutrophil 15.6. CXR showed no acute cardiopulmonary process. UA revealed >1k glucose, small amounts of blood, protein, 10-20 WBC, small amounts of leukocytosis. Likely reactive.   - will continue to monitor and trend  - Continue Unasyn      LOPEZ on CKD   Cr. 2.4. Baseline Cr. 1.5. Likely 2/2 hypovolemia.   - Cr 1.5, at baseline, will continue to monitor  - continue IVF  - daily weights   - strict I's and O's   - avoid nephrotoxic agents    Hospitalist assigned: MD Clare Webb MD   Internal Medicine Resident, PGY-3

## 2024-06-05 NOTE — PROGRESS NOTES
Patient requesting to speak with doctors about ultrasound scheduled for the AM. He has questions about why this was ordered and \"if this is something [facility]\". He also stated he would like to be \"discharged by 8 AM\". Residents aware of his request and stated they will be by to speak with him sometime throughout the night.

## 2024-06-05 NOTE — DISCHARGE SUMMARY
INTERNAL MEDICINE DEPARTMENT AT THE [facility]  DISCHARGE SUMMARY    Patient ID: Cynthia Talavera                                             Discharge Date: 6/5/2024   Patient's PCP: [facility]                                     Discharge Physician: Ivania Edwards MD  Admit Date: 6/2/2024   Admitting Physician: Babak Wilson MD    PROBLEMS DURING HOSPITALIZATION:  Present on Admission:   Diabetic ketoacidosis without coma associated with type 2 diabetes mellitus (HCC)   LOPEZ (acute kidney injury) (HCC)   Esophagitis      HPI  Cynthia Talavera is a 68 year old male with PMHx of CKD, prostate cancer, T2DM, hx of PE on rivaroxaban, and narcissistic personality disorder who presents to the ED on 6/2/2024 for nausea, vomiting, and abdominal pain. He stated these symptoms started this morning with nonbloody emesis and epigastric abdominal pain. He noticed his emesis becoming darker in the afternoon with no visible blood. He had sharp abdominal pain radiating to his chest and back. Denied any similar symptoms in the past. Denies any dysuria, diarrhea, fevers, chills, headaches, flu-like symptoms, or sick contacts.      The following issues were addressed during hospitalization:    Diabetic ketoacidosis in setting of T2DM  On admission BMP w/ bicarb 7, Cr 2.4, AG 38, glucose 311, lactic acid 2.5. VBG w/ metabolic acidosis w/ pH 7.148, pCO2 20.7, bicarb 7. He was initiated on insulin gtts and transitioned to lantus 20U nightly, LDSS, and 5U insulin TID upon closure of anion gap x2. He was tolerating PO intake well on day of discharge. Will continue lantus 40mg QHS, aliogliptan 25mg QD, empagliflozin 25mg, metformin 500mg BID on discharge.     Coffee ground emesis likely 2/2 esophagitis  GI consulted; EGD on 6/4 demonstrated small hiatal hernia, LA grade C erosive esophagitis extending 9cm in the distal esophagus w/ suspected esophageal varices, as well as non-erosive gastritis and portal hypertensive gastropathy. Hgb/hct  remained stable through admission. He is instructed to continue rivaroxaban on discharge. Liver US and doppler of abd/pelv/retroperitoneum/scrotum will be ordered for outpatient; he has been given strict instructions to f/u closely with hepatology and [facility].     LOPEZ on CKD  Cr was 2.4 on admission, up from range of 1.5 at baseline. Likely prerenal etiology in the setting of abdominal pain and emesis and poor PO intake. His Cr improved to 1.3 the morning of 6/5.     Leukocytosis, possibly reactive vs 2/2 UTI  WBC was elevated to 17.5 in the setting of DKA, emesis, and possibly UTI. UA w/ >1k glucose, small protein & blood, 10-20 WBC, small amount of leukocytes. He was started on unasyn during admission. Leukocytosis resolved by day of discharge. He will be d/c with augmentin to finish a 5 day course.     Physical Exam   Constitutional:       Appearance: He is obese. He is not ill-appearing.   Eyes:      General: No scleral icterus.  Cardiovascular:      Rate and Rhythm: Normal rate and regular rhythm.   Pulmonary:      Effort: Pulmonary effort is normal. No respiratory distress.   Abdominal:      General: There is distension.      Tenderness: There is no abdominal tenderness.   Musculoskeletal:      Right lower leg: No edema.      Left lower leg: No edema.   Skin:     General: Skin is warm.      Coloration: Skin is not jaundiced.   Neurological:      General: No focal deficit present.   Psychiatric:         Speech: Speech normal.     Consults: GI  Significant Diagnostic Studies:    CT ABDOMEN PELVIS WO CONTRAST Additional Contrast? None   Final Result      1.  Distal esophageal wall thickening which may reflect esophagitis.   2.  3.6 cm lipomatous lesion in the cecal wall.         Electronically signed by Ta Johnson MD      XR CHEST PORTABLE   Final Result      No acute cardiopulmonary findings.      Electronically signed by Ta Johnson MD      US DUP ABD PEL RETRO SCROT COMPLETE    (Results Pending)   US

## 2024-06-12 NOTE — PROGRESS NOTES
Physician Progress Note      PATIENT:               DEISI TEJADA  CSN #:                  [id number]  :                       1955  ADMIT DATE:       2024 8:42 PM  DISCH DATE:        2024 2:23 PM  RESPONDING  PROVIDER #:        Ivania Cuevas MD          QUERY TEXT:    Patient admitted with DKA and noted to have increased WBC count and HR. If   possible, please document in progress notes and discharge summary if you are   evaluating and/or treating any of the following:    The medical record reflects the following:  Risk Factors: 69 yo w/ DKA, possible UTI w/ N/V, abdominal pain  Clinical Indicators: Per PN :  Diabetic ketoacidosis. Leukocytosis 2/2   reactive vs. Urinary tract infection.  UA revealed >1k glucose, small amounts   of blood, protein, 10-20 WBC, small amounts of leukocytosis.  WBC 17.5, HR 95.  Treatment: IV Unasyn, lab monitoring, IVF bolus, lactic acid, UA  Options provided:  -- SIRS of non-infectious origin with LOPEZ  -- Sepsis due to UTI present on admission  -- Other - I will add my own diagnosis  -- Disagree - Not applicable / Not valid  -- Disagree - Clinically unable to determine / Unknown  -- Refer to Clinical Documentation Reviewer    PROVIDER RESPONSE TEXT:    This patient has SIRS of non-infectious origin due with LOPEZ.    Query created by: Sharee Wolfe on 2024 10:33 AM      Electronically signed by:  Ivania Cuevas MD 2024 11:20 AM

## (undated) DEVICE — FORCEPS BX L240CM JAW DIA2.4MM ORNG L CAP W/ NDL DISP RAD